# Patient Record
Sex: MALE | Race: WHITE | NOT HISPANIC OR LATINO | Employment: OTHER | URBAN - METROPOLITAN AREA
[De-identification: names, ages, dates, MRNs, and addresses within clinical notes are randomized per-mention and may not be internally consistent; named-entity substitution may affect disease eponyms.]

---

## 2020-12-23 ENCOUNTER — TRANSFERRED RECORDS (OUTPATIENT)
Dept: HEALTH INFORMATION MANAGEMENT | Facility: CLINIC | Age: 60
End: 2020-12-23

## 2021-01-05 PROCEDURE — 88311 DECALCIFY TISSUE: CPT | Mod: 26 | Performed by: PATHOLOGY

## 2021-01-05 PROCEDURE — 88305 TISSUE EXAM BY PATHOLOGIST: CPT | Mod: 26 | Performed by: PATHOLOGY

## 2021-07-12 NOTE — TELEPHONE ENCOUNTER
RECORDS RECEIVED FROM: internal    DATE RECEIVED: 7.29.21    NOTES (FOR ALL VISITS) STATUS DETAILS   OFFICE NOTES from referring provider In process     OFFICE NOTES from other specialist In process     ED NOTES In process     OPERATIVE REPORT  (thyroid, pituitary, adrenal, parathyroid) In process     MEDICATION LIST In process     IMAGING      DEXASCAN In process     MRI (BRAIN) In process     XR (Chest) In process     CT (HEAD/NECK/CHEST/ABDOMEN) In process     NUCLEAR  In process     ULTRASOUND (HEAD/NECK) In process     LABS     DIABETES: HBGA1C, CREATININE, FASTING LIPIDS, MICROALBUMIN URINE, POTASSIUM, TSH, T4    THYROID: TSH, T4, CBC, THYRODLONULIN, TOTAL T3, FREE T4, CALCITONIN, CEA In process          Action 7.12.21 sv   Action Taken Called pt to gether information on getting records, LVM with clinic call back number   7.23.21 sv- called pt and LVM about gathering records   7.26.21 sv- called pt 2x, phone rang once, LVM about records   7.27.21 sv - called pt again with no luck   7.28.21 sv- attempted to call pt again

## 2021-07-29 ENCOUNTER — TELEPHONE (OUTPATIENT)
Dept: ENDOCRINOLOGY | Facility: CLINIC | Age: 61
End: 2021-07-29

## 2021-07-29 ENCOUNTER — LAB (OUTPATIENT)
Dept: LAB | Facility: CLINIC | Age: 61
End: 2021-07-29
Payer: COMMERCIAL

## 2021-07-29 ENCOUNTER — PRE VISIT (OUTPATIENT)
Dept: ENDOCRINOLOGY | Facility: CLINIC | Age: 61
End: 2021-07-29

## 2021-07-29 ENCOUNTER — OFFICE VISIT (OUTPATIENT)
Dept: ENDOCRINOLOGY | Facility: CLINIC | Age: 61
End: 2021-07-29
Payer: COMMERCIAL

## 2021-07-29 VITALS — DIASTOLIC BLOOD PRESSURE: 80 MMHG | HEART RATE: 63 BPM | WEIGHT: 157.1 LBS | SYSTOLIC BLOOD PRESSURE: 131 MMHG

## 2021-07-29 DIAGNOSIS — R79.89 LOW SERUM FOLLICLE STIMULATING HORMONE (FSH): ICD-10-CM

## 2021-07-29 DIAGNOSIS — R79.89 LOW SERUM LUTEINIZING HORMONE (LH): Primary | ICD-10-CM

## 2021-07-29 DIAGNOSIS — R79.89 HIGH SERUM TESTOSTERONE: ICD-10-CM

## 2021-07-29 DIAGNOSIS — R79.89 LOW SERUM LUTEINIZING HORMONE (LH): ICD-10-CM

## 2021-07-29 LAB
FSH SERPL-ACNC: <0.2 IU/L (ref 0.7–10.8)
HCG SERPL QL: NEGATIVE
LH SERPL-ACNC: <0.2 IU/L (ref 1.5–9.3)
PROLACTIN SERPL-MCNC: 6 UG/L (ref 2–18)

## 2021-07-29 PROCEDURE — 84403 ASSAY OF TOTAL TESTOSTERONE: CPT | Mod: 90 | Performed by: PATHOLOGY

## 2021-07-29 PROCEDURE — 83002 ASSAY OF GONADOTROPIN (LH): CPT | Mod: 90 | Performed by: PATHOLOGY

## 2021-07-29 PROCEDURE — 99203 OFFICE O/P NEW LOW 30 MIN: CPT | Mod: GC | Performed by: STUDENT IN AN ORGANIZED HEALTH CARE EDUCATION/TRAINING PROGRAM

## 2021-07-29 PROCEDURE — 84702 CHORIONIC GONADOTROPIN TEST: CPT | Mod: 90 | Performed by: PATHOLOGY

## 2021-07-29 PROCEDURE — 84146 ASSAY OF PROLACTIN: CPT | Mod: 90 | Performed by: PATHOLOGY

## 2021-07-29 PROCEDURE — 36415 COLL VENOUS BLD VENIPUNCTURE: CPT | Performed by: PATHOLOGY

## 2021-07-29 PROCEDURE — 83001 ASSAY OF GONADOTROPIN (FSH): CPT | Mod: 90 | Performed by: PATHOLOGY

## 2021-07-29 RX ORDER — SILODOSIN 8 MG/1
8 CAPSULE ORAL DAILY
COMMUNITY

## 2021-07-29 RX ORDER — MINOCYCLINE HYDROCHLORIDE 100 MG/1
100 CAPSULE ORAL 2 TIMES DAILY
COMMUNITY
End: 2024-06-18

## 2021-07-29 RX ORDER — FLUTICASONE PROPIONATE 44 UG/1
1 AEROSOL, METERED RESPIRATORY (INHALATION) 2 TIMES DAILY
COMMUNITY
End: 2024-06-18

## 2021-07-29 RX ORDER — DULOXETINE 40 MG/1
1 CAPSULE, DELAYED RELEASE ORAL DAILY
COMMUNITY

## 2021-07-29 RX ORDER — FLUTICASONE PROPIONATE 50 MCG
1 SPRAY, SUSPENSION (ML) NASAL DAILY
COMMUNITY

## 2021-07-29 ASSESSMENT — PAIN SCALES - GENERAL: PAINLEVEL: NO PAIN (0)

## 2021-07-29 NOTE — PROGRESS NOTES
Endocrinology Clinic New Consult      Malachi Domínguez MRN:[id number] YOB: 1960  Primary care provider: Trevin Ash A     Reason for Endocrine consult: Abnormal labs - high testosterone and low FSH and LH    HPI:  Malachi Domínguez is a 60 year old male with recent diagnosis of Hepatitis C s/p treatment with MAVYRET for 8 weeks , is  referred to us for low testosterone levels and low FSH and LH levels. He mentions he took an online questionnaire from a [facility] clinic called [facility] and had blood work done which showed his testosterone levels to be in 200-300 range and low LH and FSH. He doesn't have the lab work done prior to starting his medication with him.He is taking  testosterone injections (concentration 100mg/ml) twice weekly used to take 0.3 ml and was reduced to 0.25 ml since 6/2021 and BHCG injections 0.25 ml twice weekly( concentration 50847/6ml) since January 2021. He gets these from a pharmacy based in [place] for 150-200$/ month. He reports he had no sex drive prior to the injections and now feels like that has improved slightly. Denies any erectile dysfunction. He noticed that his testes and penis have reduced in size since starting the medications. He also mentions that he has been having anxiety and jitteriness for past 3 months, attributes it to his friend's suicide. He reports having headaches occasionally, no change in vision. He last took his injections yesterday.    He mentions he has been having fatigue and cramps in his lower extremities for a few years.  For last couple of years, has difficulty sleeping at nights , sleeps usually 2-3 hours at a time, doesn't reports snoring at night, has never been assessed for obstructive sleep apnea. He also mentions he is very physically active person but he noticed he was not developing muscles as he expected. He does 3 times [employer], works out regularly. He reports having nausea for past few years uncertain  when it all started but has to throw up, is not related to food or any particular timing. He mentions that has improved after getting treated for hepatitis C.         ROS:  All 12 systems were reviewed and negative except as mentioned in HPI    Past Medical/Surgical History:  Past Medical History:   Diagnosis Date     Hepatitis C virus infection, unspecified chronicity     S/p 8 weeks of Mavyret- now viral load undetectable     Peripheral polyneuropathy     S/p cervical and lumbar laminectomy     Past Surgical History:   Procedure Laterality Date     LAMINECTOMY  2000    Multiple involves L3-S1     LAMINECTOMY CERIVCAL POSTERIOR TWO LEVELS      1990     Allergies:  Allergies   Allergen Reactions     Azithromycin Shortness Of Breath     Per Dr Elizalde's notes dated 1-12-17     Sulfa Drugs Rash       PTA Meds:  Prior to Admission medications    Medication Sig Last Dose Taking? Auth Provider   Chorionic Gonadotropin (HCG IJ)  Taking Yes Reported, Patient   DULoxetine HCl 40 MG CPEP  Taking Yes Reported, Patient   fexofenadine (ALLEGRA) 30 MG/5ML suspension Take by mouth daily Taking Yes Reported, Patient   fluticasone (FLONASE) 50 MCG/ACT nasal spray Spray 1 spray into both nostrils daily Taking Yes Reported, Patient   fluticasone (FLOVENT HFA) 110 MCG/ACT inhaler Inhale 1 puff into the lungs 2 times daily Taking Yes Reported, Patient   minocycline (MINOCIN) 100 MG capsule Take 100 mg by mouth 2 times daily Taking Yes Reported, Patient   Silodosin (RAPAFLO) 8 MG CAPS capsule Take 4 mg by mouth daily Taking Yes Reported, Patient   TESTOSTERONE ENANTHATE IJ  Taking Yes Reported, Patient        Current heard:   Current Outpatient Medications   Medication     Chorionic Gonadotropin (HCG IJ)     DULoxetine HCl 40 MG CPEP     fexofenadine (ALLEGRA) 30 MG/5ML suspension     fluticasone (FLONASE) 50 MCG/ACT nasal spray     fluticasone (FLOVENT HFA) 110 MCG/ACT inhaler     minocycline (MINOCIN) 100 MG capsule     Silodosin  "(RAPAFLO) 8 MG CAPS capsule     TESTOSTERONE ENANTHATE IJ     No current facility-administered medications for this visit.       Family History:  Family History   Problem Relation Age of Onset     Prostate Cancer Father      Breast Cancer Mother      Skin Cancer Sister      Social History:  Social History     Socioeconomic History     Marital status: Single     Spouse name: Not on file     Number of children: Not on file     Years of education: Not on file     Highest education level: Not on file   Occupational History     Not on file   Tobacco Use     Smoking status: Former Smoker     Packs/day: 1.00     Years: 10.00     Pack years: 10.00     Types: Cigarettes   Substance and Sexual Activity     Alcohol use: Not Currently     Comment: Quit 20 years ago     Drug use: Not Currently     Types: \"Crack\" cocaine     Comment: Was a IV drug user- used cocaine 30 years ago     Sexual activity: Not on file   Other Topics Concern     Not on file   Social History Narrative     Not on file     Social Determinants of Health     Financial Resource Strain:      Difficulty of Paying Living Expenses:    Food Insecurity:      Worried About Running Out of Food in the Last Year:      Ran Out of Food in the Last Year:    Transportation Needs:      Lack of Transportation (Medical):      Lack of Transportation (Non-Medical):    Physical Activity:      Days of Exercise per Week:      Minutes of Exercise per Session:    Stress:      Feeling of Stress :    Social Connections:      Frequency of Communication with Friends and Family:      Frequency of Social Gatherings with Friends and Family:      Attends [employer] Services:      Active Member of Clubs or Organizations:      Attends Club or Organization Meetings:      Marital Status:    Intimate Partner Violence:      Fear of Current or Ex-Partner:      Emotionally Abused:      Physically Abused:      Sexually Abused:          Physical examination:  General appearance: seated comfortably in " the chair during assessment. Not in any acute distress  HEENT: PEERLA. Oral cavity is moist and clear. No thyroid swelling noted, non tender  Lungs: bilateral air entry equal. Clear to auscultation. No rhonchi or crepitations heard  Heart: S1 S2 normal. Pulse: regular rate and rhythm, good volume  Abdomen: soft, nontender, non distended  Genitilia: Testicular volume 25ml bilaterally, normal phallus  Neurological: conscious and oriented. Speech: normal. Moving all four extremities equally  Extremities: no edema noted. Dorsalis pedis 2+ bilaterally. No ulcers noted. No tremor is noted over her outstretched hands  Psychiatric: normal mood and affect. Normal judgment    Endocrine Labs:  2021  Hb : 16.4 mg/dl , hematocrit:51.5  Creat: 1.25  Total testosterone: 1350 ng/dl, free testosterone : 122 ng/dl  SHB.3 nmol/L  Dihydrotestosterone: 118 ng/dl  FSH: <0.3 and LH< 0.3, IGF-1: 125 ng/ml  TSH: 2.7uIU/ml   , free t4: 0.92ng/dl  , free T3: 2.8 pg/dl, Cortisol: 12.3 mcg/ml  PSA: 0.8 ng/ml, Pregnenolone: 13 ng/dl, DHEAS: 12 mcg/dl, estradiol: 9,5 pg/ml  Vitamin D 25-oH : 30.7, Vitamin B12: 754 pg/ml            Assessment and Plan:   Malachi Domínguez is a 60 year old male with recent diagnosis of Hepatitis C s/p treatment with MAVYRET for 8 weeks , is  referred to us for low testosterone levels and low FSH and LH levels    # High testosterone level on testosterone injections  # Low FSH and LH on testosterone and BHCG injections    - Recommended patient send us his blood work done prior to starting the testosterone injections to ascertain if he had low levels  - Will obtain total testosterone, BHCG, prolactin, LH , FSH and CBC  today  - Discussed with him the risks with having high testosterone levels including heart attack, heart failure, stroke and infertility.  - Will consider tapering the testosterone dose once we obtain the results of his blood work.      The patient was seen, examined and discussed with   MD Vijay Bearden MD.  Endocrinology fellow

## 2021-07-29 NOTE — LETTER
7/29/2021       RE: Malachi Domínguez  [address]  [place] [address]     Dear Colleague,    Thank you for referring your patient, Malachi Domínguez, to the [facility] [place] at [facility]. Please see a copy of my visit note below.    Attending tie-in statement: Patient seen and examined by me, discussed with fellow whose note I have reviewed and with which I agree.  Key elements and diagnostic points include the following:    He is on testosterone and HCG (52715/6 ml) 2 times/week since January 2021 started by online clinic in [place]   Price 150-200/month for the 2 drugs using pharmacy in [place].   currently  T Changed from 0.3  to 0.25 ml (? 50 mg at 200 mg/ml concentration) 2 times/week weeks since last labs, 6/1  HCG dose is also 0.25 ml 2 times/week  Last injections yesterday    We don't have labs from prior to treatment but he has it at home.   5/21/2021 ([facility]) Hgb 16.5, hCt 51.5, creatinine 1.25  Testosterone 1350, SHBG 99.3,  (30-85), PSA 0.8, DHEAS 12 mcg/dl (< 298), pregnenolone 30 ng/dl (< 151), FSH < 0.3,LH < 0.3  IGF1 125 , vitamin D 30.7, estradiol 9.5 (nl), free T4 0.92, cortisol 12.3, TSH 2.7, free T3 2.8, B12 754    ROS:  Testes getting smaller   Penis getting smaller  Libido improved a lilttle  Sleeps < 4 hours/night in short intervals up to 2 hours    GENERAL middle aged man in NAD;   /80   Pulse 63   Wt 71.3 kg (157 lb 1.6 oz)   SKIN: normal color, temperature, texture  purple striae  HEENT: PER, EOMI, no scleral icterus, eyelid retraction, stare, lid lag, proptosis or conjunctival injection.    NECK: supple.  No visible neck masses, cervical adenopathy, or goiter. Thyroid is not palpable  LUNGS: clear to auscultation bilaterally.   CARDIAC: RRR, S1, S2 without murmurs, rubs or gallops.    ABDOMEN: positive bowel sounds, soft nontender without hepatosplenomegaly or masses.  BACK:  normal spinal contour.    NEURO: Alert, responds appropriately to questions, moves all extremities, DTRs 2/4, gait normal, no tremor of the outstretched hand  Genitalia: normal phallus; testicular volume 25 ml bilaterally    Assessment/plan:   High testosterone on  Testosterone treatment.  We do not have pre-treatment data. If he is confirmed to have had low T with low FSH/LH prior to treatment then pituitary differential needs to be considered and worked up.  Prolactin with labs today which we expect will again show high T since he just got the dose yesterday    Low FSH and LH on testosterone- as per #1.  This is expected response to feedback inhibition from the testosterone.      On HCG-we believe he is getting 500 units/dose,  presumably this is being given in an attempt to counteract the negative feedback influence of testosterone, intended to stimulate endogenous T production,  Spermatogenesis and increase testicular volume. The dose is considerably lower than the 2000 units three times/week that might be used to try to treat male infertility. The 500 units/dose every other day has been reported with T coadministration   https://pubmed.ncbi.nlm.nih.gov/36156900/  I am surprised at the cost he is reporting for the therapy, which is less than I would have expected.  Is he really getting HCG?   We will measure HCG     High SHBG- his level is higher than I would expect for the T treatment. Perhaps this is an SHBG effect.  Differential includes the liver disease, other.   https://pubmed.ncbi.nlm.nih.gov/6075667/      Terri Bearden MD  42__ minutes spent on the date of the encounter doing chart review, history and exam, documentation and further activities as noted above.        Endocrinology Clinic New Consult      Malachi Domínguez MRN:[id number] YOB: 1960  Primary care provider: Trevin Ash     Reason for Endocrine consult: Abnormal labs - high testosterone and low FSH and  LH    HPI:  Malachi Domínguez is a 60 year old male with recent diagnosis of Hepatitis C s/p treatment with MAVYRET for 8 weeks , is  referred to us for low testosterone levels and low FSH and LH levels. He mentions he took an online questionnaire from a [facility] clinic called [facility] and had blood work done which showed his testosterone levels to be in 200-300 range and low LH and FSH. He doesn't have the lab work done prior to starting his medication with him.He is taking  testosterone injections (concentration 100mg/ml) twice weekly used to take 0.3 ml and was reduced to 0.25 ml since 6/2021 and BHCG injections 0.25 ml twice weekly( concentration 29360/6ml) since January 2021. He gets these from a pharmacy based in [place] for 150-200$/ month. He reports he had no sex drive prior to the injections and now feels like that has improved slightly. Denies any erectile dysfunction. He noticed that his testes and penis have reduced in size since starting the medications. He also mentions that he has been having anxiety and jitteriness for past 3 months, attributes it to his friend's suicide. He reports having headaches occasionally, no change in vision. He last took his injections yesterday.    He mentions he has been having fatigue and cramps in his lower extremities for a few years.  For last couple of years, has difficulty sleeping at nights , sleeps usually 2-3 hours at a time, doesn't reports snoring at night, has never been assessed for obstructive sleep apnea. He also mentions he is very physically active person but he noticed he was not developing muscles as he expected. He does 3 times [employer], works out regularly. He reports having nausea for past few years uncertain when it all started but has to throw up, is not related to food or any particular timing. He mentions that has improved after getting treated for hepatitis C.         ROS:  All 12 systems were reviewed and negative except  as mentioned in HPI    Past Medical/Surgical History:  Past Medical History:   Diagnosis Date     Hepatitis C virus infection, unspecified chronicity     S/p 8 weeks of Mavyret- now viral load undetectable     Peripheral polyneuropathy     S/p cervical and lumbar laminectomy     Past Surgical History:   Procedure Laterality Date     LAMINECTOMY  2000    Multiple involves L3-S1     LAMINECTOMY CERIVCAL POSTERIOR TWO LEVELS      1990     Allergies:  Allergies   Allergen Reactions     Azithromycin Shortness Of Breath     Per Dr Elizalde's notes dated 1-12-17     Sulfa Drugs Rash       PTA Meds:  Prior to Admission medications    Medication Sig Last Dose Taking? Auth Provider   Chorionic Gonadotropin (HCG IJ)  Taking Yes Reported, Patient   DULoxetine HCl 40 MG CPEP  Taking Yes Reported, Patient   fexofenadine (ALLEGRA) 30 MG/5ML suspension Take by mouth daily Taking Yes Reported, Patient   fluticasone (FLONASE) 50 MCG/ACT nasal spray Spray 1 spray into both nostrils daily Taking Yes Reported, Patient   fluticasone (FLOVENT HFA) 110 MCG/ACT inhaler Inhale 1 puff into the lungs 2 times daily Taking Yes Reported, Patient   minocycline (MINOCIN) 100 MG capsule Take 100 mg by mouth 2 times daily Taking Yes Reported, Patient   Silodosin (RAPAFLO) 8 MG CAPS capsule Take 4 mg by mouth daily Taking Yes Reported, Patient   TESTOSTERONE ENANTHATE IJ  Taking Yes Reported, Patient        Current heard:   Current Outpatient Medications   Medication     Chorionic Gonadotropin (HCG IJ)     DULoxetine HCl 40 MG CPEP     fexofenadine (ALLEGRA) 30 MG/5ML suspension     fluticasone (FLONASE) 50 MCG/ACT nasal spray     fluticasone (FLOVENT HFA) 110 MCG/ACT inhaler     minocycline (MINOCIN) 100 MG capsule     Silodosin (RAPAFLO) 8 MG CAPS capsule     TESTOSTERONE ENANTHATE IJ     No current facility-administered medications for this visit.       Family History:  Family History   Problem Relation Age of Onset     Prostate Cancer Father       "Breast Cancer Mother      Skin Cancer Sister      Social History:  Social History     Socioeconomic History     Marital status: Single     Spouse name: Not on file     Number of children: Not on file     Years of education: Not on file     Highest education level: Not on file   Occupational History     Not on file   Tobacco Use     Smoking status: Former Smoker     Packs/day: 1.00     Years: 10.00     Pack years: 10.00     Types: Cigarettes   Substance and Sexual Activity     Alcohol use: Not Currently     Comment: Quit 20 years ago     Drug use: Not Currently     Types: \"Crack\" cocaine     Comment: Was a IV drug user- used cocaine 30 years ago     Sexual activity: Not on file   Other Topics Concern     Not on file   Social History Narrative     Not on file     Social Determinants of Health     Financial Resource Strain:      Difficulty of Paying Living Expenses:    Food Insecurity:      Worried About Running Out of Food in the Last Year:      Ran Out of Food in the Last Year:    Transportation Needs:      Lack of Transportation (Medical):      Lack of Transportation (Non-Medical):    Physical Activity:      Days of Exercise per Week:      Minutes of Exercise per Session:    Stress:      Feeling of Stress :    Social Connections:      Frequency of Communication with Friends and Family:      Frequency of Social Gatherings with Friends and Family:      Attends [employer] Services:      Active Member of Clubs or Organizations:      Attends Club or Organization Meetings:      Marital Status:    Intimate Partner Violence:      Fear of Current or Ex-Partner:      Emotionally Abused:      Physically Abused:      Sexually Abused:          Physical examination:  General appearance: seated comfortably in the chair during assessment. Not in any acute distress  HEENT: PEERLA. Oral cavity is moist and clear. No thyroid swelling noted, non tender  Lungs: bilateral air entry equal. Clear to auscultation. No rhonchi or crepitations " heard  Heart: S1 S2 normal. Pulse: regular rate and rhythm, good volume  Abdomen: soft, nontender, non distended  Genitilia: Testicular volume 25ml bilaterally, normal phallus  Neurological: conscious and oriented. Speech: normal. Moving all four extremities equally  Extremities: no edema noted. Dorsalis pedis 2+ bilaterally. No ulcers noted. No tremor is noted over her outstretched hands  Psychiatric: normal mood and affect. Normal judgment    Endocrine Labs:  2021  Hb : 16.4 mg/dl , hematocrit:51.5  Creat: 1.25  Total testosterone: 1350 ng/dl, free testosterone : 122 ng/dl  SHB.3 nmol/L  Dihydrotestosterone: 118 ng/dl  FSH: <0.3 and LH< 0.3, IGF-1: 125 ng/ml  TSH: 2.7uIU/ml   , free t4: 0.92ng/dl  , free T3: 2.8 pg/dl, Cortisol: 12.3 mcg/ml  PSA: 0.8 ng/ml, Pregnenolone: 13 ng/dl, DHEAS: 12 mcg/dl, estradiol: 9,5 pg/ml  Vitamin D 25-oH : 30.7, Vitamin B12: 754 pg/ml            Assessment and Plan:   Malachi Domínguez is a 60 year old male with recent diagnosis of Hepatitis C s/p treatment with MAVYRET for 8 weeks , is  referred to us for low testosterone levels and low FSH and LH levels    # High testosterone level on testosterone injections  # Low FSH and LH on testosterone and BHCG injections    - Recommended patient send us his blood work done prior to starting the testosterone injections to ascertain if he had low levels  - Will obtain total testosterone, BHCG, prolactin, LH , FSH and CBC  today  - Discussed with him the risks with having high testosterone levels including heart attack, heart failure, stroke and infertility.  - Will consider tapering the testosterone dose once we obtain the results of his blood work.      The patient was seen, examined and discussed with MD Vijay Shelley MD.  Endocrinology fellow

## 2021-07-29 NOTE — TELEPHONE ENCOUNTER
Duncan  calls for clarification on HCG order. HCG as ordered goes to 20iu per Liter, Tumor marker goes to 5iu per liter.   Providers notified in person.   Gricelda Wallace RN on 7/29/2021 at 10:19 AM

## 2021-07-29 NOTE — PROGRESS NOTES
Attending tie-in statement: Patient seen and examined by me, discussed with fellow whose note I have reviewed and with which I agree.  Key elements and diagnostic points include the following:    He is on testosterone and HCG (25182/6 ml) 2 times/week since January 2021 started by online clinic in [place]   Price 150-200/month for the 2 drugs using pharmacy in [place].   currently  T Changed from 0.3  to 0.25 ml (? 50 mg at 200 mg/ml concentration) 2 times/week weeks since last labs, 6/1  HCG dose is also 0.25 ml 2 times/week  Last injections yesterday    We don't have labs from prior to treatment but he has it at home.   5/21/2021 ([facility]) Hgb 16.5, hCt 51.5, creatinine 1.25  Testosterone 1350, SHBG 99.3,  (30-85), PSA 0.8, DHEAS 12 mcg/dl (< 298), pregnenolone 30 ng/dl (< 151), FSH < 0.3,LH < 0.3  IGF1 125 , vitamin D 30.7, estradiol 9.5 (nl), free T4 0.92, cortisol 12.3, TSH 2.7, free T3 2.8, B12 754    ROS:  Testes getting smaller   Penis getting smaller  Libido improved a lilttle  Sleeps < 4 hours/night in short intervals up to 2 hours    GENERAL middle aged man in NAD;   /80   Pulse 63   Wt 71.3 kg (157 lb 1.6 oz)   SKIN: normal color, temperature, texture  purple striae  HEENT: PER, EOMI, no scleral icterus, eyelid retraction, stare, lid lag, proptosis or conjunctival injection.    NECK: supple.  No visible neck masses, cervical adenopathy, or goiter. Thyroid is not palpable  LUNGS: clear to auscultation bilaterally.   CARDIAC: RRR, S1, S2 without murmurs, rubs or gallops.    ABDOMEN: positive bowel sounds, soft nontender without hepatosplenomegaly or masses.  BACK: normal spinal contour.    NEURO: Alert, responds appropriately to questions, moves all extremities, DTRs 2/4, gait normal, no tremor of the outstretched hand  Genitalia: normal phallus; testicular volume 25 ml bilaterally    Assessment/plan:   High testosterone on  Testosterone treatment.  We do not have pre-treatment data. If he is  confirmed to have had low T with low FSH/LH prior to treatment then pituitary differential needs to be considered and worked up.  Prolactin with labs today which we expect will again show high T since he just got the dose yesterday    Low FSH and LH on testosterone- as per #1.  This is expected response to feedback inhibition from the testosterone.      On HCG-we believe he is getting 500 units/dose,  presumably this is being given in an attempt to counteract the negative feedback influence of testosterone, intended to stimulate endogenous T production,  Spermatogenesis and increase testicular volume. The dose is considerably lower than the 2000 units three times/week that might be used to try to treat male infertility. The 500 units/dose every other day has been reported with T coadministration   https://pubmed.ncbi.nlm.nih.gov/23499879/  I am surprised at the cost he is reporting for the therapy, which is less than I would have expected.  Is he really getting HCG?   We will measure HCG     High SHBG- his level is higher than I would expect for the T treatment. Perhaps this is an SHBG effect.  Differential includes the liver disease, other.   https://pubmed.ncbi.nlm.nih.gov/7016910/      Terri Bearden MD  42__ minutes spent on the date of the encounter doing chart review, history and exam, documentation and further activities as noted above.

## 2021-07-29 NOTE — TELEPHONE ENCOUNTER
Duncan, : He will credit for current order HCG qualitative, Blood ([id number])  Dr Cheung states quantitative HCG requested.   They will run new order when in place with same specimen.   Providers notified.   Gricelda Wallace RN on 7/29/2021 at 11:18 AM

## 2021-07-29 NOTE — TELEPHONE ENCOUNTER
----- Message from Terri Bearden MD sent at 7/29/2021 11:06 AM CDT -----  Regarding: HCG  We got the wrong HCG, not the one we discussed.  Please confirm if they are doing the one we discussed and get the wrong one credited.  Thanks  Terri Bearden

## 2021-07-30 PROBLEM — R79.89 HIGH SERUM TESTOSTERONE: Status: ACTIVE | Noted: 2021-07-30

## 2021-07-30 PROBLEM — R79.89 LOW SERUM LUTEINIZING HORMONE (LH): Status: ACTIVE | Noted: 2021-07-30

## 2021-07-30 PROBLEM — R79.89 LOW SERUM FOLLICLE STIMULATING HORMONE (FSH): Status: ACTIVE | Noted: 2021-07-30

## 2021-07-30 LAB — HCG-TM SERPL-ACNC: 12 IU/L

## 2021-08-02 LAB — TESTOST SERPL-MCNC: 1419 NG/DL (ref 240–950)

## 2021-08-03 ENCOUNTER — TELEPHONE (OUTPATIENT)
Dept: ENDOCRINOLOGY | Facility: CLINIC | Age: 61
End: 2021-08-03

## 2021-08-03 NOTE — TELEPHONE ENCOUNTER
Detailed message left for patient to give us information on where he got labs completed at so we can request.

## 2021-08-03 NOTE — TELEPHONE ENCOUNTER
[facility]    Phone Message    May a detailed message be left on voicemail: yes , Patient is wanting to get a call back in regards to getting the results or sent the results on [employer]. Please advise.    Patient is also wanting to get help with uploading previous results from 6 months ago onto SiS[employer] for Dr. Bearden to review.     Reason for Call: Requesting Results     Name/type of test: Blood work    Date of test: 7/29/2021    Was test done at a location other than [facility] (Please fill in the location if not [facility])?: No      Action Taken: Message routed to:  [facility] ([facility] Endo    Travel Screening: Not Applicable

## 2021-08-06 ENCOUNTER — TELEPHONE (OUTPATIENT)
Dept: ENDOCRINOLOGY | Facility: CLINIC | Age: 61
End: 2021-08-06

## 2021-09-11 ENCOUNTER — HEALTH MAINTENANCE LETTER (OUTPATIENT)
Age: 61
End: 2021-09-11

## 2021-11-06 ENCOUNTER — HEALTH MAINTENANCE LETTER (OUTPATIENT)
Age: 61
End: 2021-11-06

## 2021-12-06 DIAGNOSIS — Z11.59 ENCOUNTER FOR SCREENING FOR OTHER VIRAL DISEASES: ICD-10-CM

## 2021-12-27 ENCOUNTER — LAB (OUTPATIENT)
Dept: URGENT CARE | Facility: URGENT CARE | Age: 61
End: 2021-12-27
Attending: ORTHOPAEDIC SURGERY
Payer: COMMERCIAL

## 2021-12-27 DIAGNOSIS — Z11.59 ENCOUNTER FOR SCREENING FOR OTHER VIRAL DISEASES: ICD-10-CM

## 2021-12-27 PROCEDURE — U0003 INFECTIOUS AGENT DETECTION BY NUCLEIC ACID (DNA OR RNA); SEVERE ACUTE RESPIRATORY SYNDROME CORONAVIRUS 2 (SARS-COV-2) (CORONAVIRUS DISEASE [COVID-19]), AMPLIFIED PROBE TECHNIQUE, MAKING USE OF HIGH THROUGHPUT TECHNOLOGIES AS DESCRIBED BY CMS-2020-01-R: HCPCS

## 2021-12-27 PROCEDURE — U0005 INFEC AGEN DETEC AMPLI PROBE: HCPCS

## 2021-12-28 ENCOUNTER — ANESTHESIA EVENT (OUTPATIENT)
Dept: SURGERY | Facility: CLINIC | Age: 61
End: 2021-12-28
Payer: COMMERCIAL

## 2021-12-28 LAB — SARS-COV-2 RNA RESP QL NAA+PROBE: NEGATIVE

## 2021-12-29 RX ORDER — FEXOFENADINE HCL AND PSEUDOEPHEDRINE HCL 180; 240 MG/1; MG/1
1 TABLET, EXTENDED RELEASE ORAL DAILY
COMMUNITY

## 2021-12-29 RX ORDER — HYDROCODONE BITARTRATE AND ACETAMINOPHEN 5; 325 MG/1; MG/1
1 TABLET ORAL EVERY 6 HOURS PRN
Status: ON HOLD | COMMUNITY
End: 2021-12-31

## 2021-12-29 RX ORDER — ALBUTEROL SULFATE 90 UG/1
2 AEROSOL, METERED RESPIRATORY (INHALATION) EVERY 6 HOURS
COMMUNITY

## 2021-12-29 NOTE — PROGRESS NOTES
PTA medications updated by Medication Scribe prior to surgery via phone call with patient (last doses completed by Nurse)     Medication history sources: Patient, Surescripts, H&P and Patient's home med list  In the past week, patient estimated taking medication this percent of the time: Greater than 90%  Adherence assessment: N/A Not Observed    Significant changes made to the medication list:  None      Additional medication history information:   Patient brought own home meds: Flonase Nasal Spray, Flovent Inhaler    Medication reconciliation completed by provider prior to medication history? No    Time spent in this activity: 25 minutes    The information provided in this note is only as accurate as the sources available at the time of update(s)    Prior to Admission medications    Medication Sig Last Dose Taking? Auth Provider   albuterol (PROAIR HFA/PROVENTIL HFA/VENTOLIN HFA) 108 (90 Base) MCG/ACT inhaler Inhale 2 puffs into the lungs every 6 hours  at PRN Yes Reported, Patient   ASHWAGANDHA PO Take 1 g by mouth daily MORE THAN A WEEK Yes Reported, Patient   Chorionic Gonadotropin (HCG IJ) Inject as directed twice a week  12/26/2021 at AM Yes Reported, Patient   DULoxetine HCl 40 MG CPEP Take 1 capsule by mouth daily  12/29/2021 at AM Yes Reported, Patient   fexofenadine-pseudoePHEDrine (ALLEGRA-D 24) 180-240 MG 24 hr tablet Take 1 tablet by mouth daily 12/29/2021 at AM Yes Reported, Patient   fluticasone (FLONASE) 50 MCG/ACT nasal spray Spray 1 spray into both nostrils daily 12/29/2021 at AM Yes Reported, Patient   fluticasone (FLOVENT HFA) 110 MCG/ACT inhaler Inhale 1 puff into the lungs 2 times daily 12/29/2021 at PM Yes Reported, Patient   HYDROcodone-acetaminophen (NORCO) 5-325 MG tablet Take 1 tablet by mouth every 6 hours as needed for severe pain 12/29/2021 at PM Yes Reported, Patient   minocycline (MINOCIN) 100 MG capsule Take 100 mg by mouth 2 times daily 12/29/2021 at PM Yes Reported, Patient    Silodosin (RAPAFLO) 8 MG CAPS capsule Take 8 mg by mouth daily  12/29/2021 at AM Yes Reported, Patient   TESTOSTERONE ENANTHATE IJ Inject as directed twice a week  12/26/2021 at AM Yes Reported, Patient     Medication history completed by:    Mckinley Naqvi CPhT  Medication [facility]

## 2021-12-30 ENCOUNTER — ANESTHESIA (OUTPATIENT)
Dept: SURGERY | Facility: CLINIC | Age: 61
End: 2021-12-30
Payer: COMMERCIAL

## 2021-12-30 ENCOUNTER — APPOINTMENT (OUTPATIENT)
Dept: GENERAL RADIOLOGY | Facility: CLINIC | Age: 61
End: 2021-12-30
Attending: STUDENT IN AN ORGANIZED HEALTH CARE EDUCATION/TRAINING PROGRAM
Payer: COMMERCIAL

## 2021-12-30 ENCOUNTER — HOSPITAL ENCOUNTER (OUTPATIENT)
Facility: CLINIC | Age: 61
Discharge: HOME | End: 2021-12-31
Attending: ORTHOPAEDIC SURGERY | Admitting: ORTHOPAEDIC SURGERY
Payer: COMMERCIAL

## 2021-12-30 DIAGNOSIS — G89.18 POSTOPERATIVE PAIN: Primary | ICD-10-CM

## 2021-12-30 PROBLEM — Z98.890 POSTOPERATIVE STATE: Status: ACTIVE | Noted: 2021-12-30

## 2021-12-30 LAB — HGB BLD-MCNC: 15.3 G/DL (ref 13.3–17.7)

## 2021-12-30 PROCEDURE — 88305 TISSUE EXAM BY PATHOLOGIST: CPT | Mod: TC | Performed by: ORTHOPAEDIC SURGERY

## 2021-12-30 PROCEDURE — 999N000141 HC STATISTIC PRE-PROCEDURE NURSING ASSESSMENT: Performed by: ORTHOPAEDIC SURGERY

## 2021-12-30 PROCEDURE — 999N000065 XR SHOULDER RIGHT PORT G/E 2 VIEWS: Mod: RT

## 2021-12-30 PROCEDURE — 88311 DECALCIFY TISSUE: CPT | Mod: TC | Performed by: ORTHOPAEDIC SURGERY

## 2021-12-30 PROCEDURE — 250N000025 HC SEVOFLURANE, PER MIN: Performed by: ORTHOPAEDIC SURGERY

## 2021-12-30 PROCEDURE — 250N000011 HC RX IP 250 OP 636: Performed by: SURGERY

## 2021-12-30 PROCEDURE — 370N000017 HC ANESTHESIA TECHNICAL FEE, PER MIN: Performed by: ORTHOPAEDIC SURGERY

## 2021-12-30 PROCEDURE — 278N000051 HC OR IMPLANT GENERAL: Performed by: ORTHOPAEDIC SURGERY

## 2021-12-30 PROCEDURE — 250N000009 HC RX 250: Performed by: NURSE ANESTHETIST, CERTIFIED REGISTERED

## 2021-12-30 PROCEDURE — 258N000003 HC RX IP 258 OP 636: Performed by: ANESTHESIOLOGY

## 2021-12-30 PROCEDURE — 250N000011 HC RX IP 250 OP 636: Performed by: STUDENT IN AN ORGANIZED HEALTH CARE EDUCATION/TRAINING PROGRAM

## 2021-12-30 PROCEDURE — 99207 PR NO CHARGE LOS: CPT | Performed by: PHYSICIAN ASSISTANT

## 2021-12-30 PROCEDURE — 250N000013 HC RX MED GY IP 250 OP 250 PS 637: Performed by: STUDENT IN AN ORGANIZED HEALTH CARE EDUCATION/TRAINING PROGRAM

## 2021-12-30 PROCEDURE — 360N000077 HC SURGERY LEVEL 4, PER MIN: Performed by: ORTHOPAEDIC SURGERY

## 2021-12-30 PROCEDURE — 85018 HEMOGLOBIN: CPT | Performed by: ANESTHESIOLOGY

## 2021-12-30 PROCEDURE — 250N000013 HC RX MED GY IP 250 OP 250 PS 637: Performed by: PHYSICIAN ASSISTANT

## 2021-12-30 PROCEDURE — 36415 COLL VENOUS BLD VENIPUNCTURE: CPT | Performed by: ANESTHESIOLOGY

## 2021-12-30 PROCEDURE — 258N000003 HC RX IP 258 OP 636: Performed by: NURSE ANESTHETIST, CERTIFIED REGISTERED

## 2021-12-30 PROCEDURE — 272N000001 HC OR GENERAL SUPPLY STERILE: Performed by: ORTHOPAEDIC SURGERY

## 2021-12-30 PROCEDURE — 250N000011 HC RX IP 250 OP 636: Performed by: ORTHOPAEDIC SURGERY

## 2021-12-30 PROCEDURE — 710N000009 HC RECOVERY PHASE 1, LEVEL 1, PER MIN: Performed by: ORTHOPAEDIC SURGERY

## 2021-12-30 PROCEDURE — 250N000011 HC RX IP 250 OP 636: Performed by: NURSE ANESTHETIST, CERTIFIED REGISTERED

## 2021-12-30 PROCEDURE — P9041 ALBUMIN (HUMAN),5%, 50ML: HCPCS | Performed by: NURSE ANESTHETIST, CERTIFIED REGISTERED

## 2021-12-30 PROCEDURE — 258N000003 HC RX IP 258 OP 636: Performed by: STUDENT IN AN ORGANIZED HEALTH CARE EDUCATION/TRAINING PROGRAM

## 2021-12-30 PROCEDURE — 250N000009 HC RX 250: Performed by: SURGERY

## 2021-12-30 PROCEDURE — C1776 JOINT DEVICE (IMPLANTABLE): HCPCS | Performed by: ORTHOPAEDIC SURGERY

## 2021-12-30 DEVICE — IMPLANTABLE DEVICE: Type: IMPLANTABLE DEVICE | Site: SHOULDER | Status: FUNCTIONAL

## 2021-12-30 DEVICE — HALF DOSE BONE CEMENT, 10 PACK CATALOG NUMBER IS 6188-1-010
Type: IMPLANTABLE DEVICE | Site: SHOULDER | Status: FUNCTIONAL
Brand: SIMPLEX

## 2021-12-30 DEVICE — IMPLANTABLE DEVICE
Type: IMPLANTABLE DEVICE | Site: SHOULDER | Status: FUNCTIONAL
Brand: AEQUALIS™ ASCEND™ FLEX

## 2021-12-30 DEVICE — IMPLANTABLE DEVICE
Type: IMPLANTABLE DEVICE | Site: SHOULDER | Status: FUNCTIONAL
Brand: AEQUALIS™ PERFORM

## 2021-12-30 RX ORDER — ACETAMINOPHEN 325 MG/1
650 TABLET ORAL EVERY 4 HOURS PRN
Status: DISCONTINUED | OUTPATIENT
Start: 2022-01-02 | End: 2021-12-31 | Stop reason: HOSPADM

## 2021-12-30 RX ORDER — ONDANSETRON 2 MG/ML
INJECTION INTRAMUSCULAR; INTRAVENOUS PRN
Status: DISCONTINUED | OUTPATIENT
Start: 2021-12-30 | End: 2021-12-30

## 2021-12-30 RX ORDER — HYDROXYZINE HYDROCHLORIDE 25 MG/1
25 TABLET, FILM COATED ORAL EVERY 6 HOURS PRN
Status: DISCONTINUED | OUTPATIENT
Start: 2021-12-30 | End: 2021-12-31 | Stop reason: HOSPADM

## 2021-12-30 RX ORDER — ASPIRIN 81 MG/1
81 TABLET ORAL 2 TIMES DAILY
Status: DISCONTINUED | OUTPATIENT
Start: 2021-12-30 | End: 2021-12-31 | Stop reason: HOSPADM

## 2021-12-30 RX ORDER — NALOXONE HYDROCHLORIDE 0.4 MG/ML
0.4 INJECTION, SOLUTION INTRAMUSCULAR; INTRAVENOUS; SUBCUTANEOUS
Status: DISCONTINUED | OUTPATIENT
Start: 2021-12-30 | End: 2021-12-31 | Stop reason: HOSPADM

## 2021-12-30 RX ORDER — PROPOFOL 10 MG/ML
INJECTION, EMULSION INTRAVENOUS CONTINUOUS PRN
Status: DISCONTINUED | OUTPATIENT
Start: 2021-12-30 | End: 2021-12-30

## 2021-12-30 RX ORDER — ALBUMIN, HUMAN INJ 5% 5 %
SOLUTION INTRAVENOUS CONTINUOUS PRN
Status: DISCONTINUED | OUTPATIENT
Start: 2021-12-30 | End: 2021-12-30

## 2021-12-30 RX ORDER — LIDOCAINE 40 MG/G
CREAM TOPICAL
Status: DISCONTINUED | OUTPATIENT
Start: 2021-12-30 | End: 2021-12-31 | Stop reason: HOSPADM

## 2021-12-30 RX ORDER — HYDROMORPHONE HCL IN WATER/PF 6 MG/30 ML
0.4 PATIENT CONTROLLED ANALGESIA SYRINGE INTRAVENOUS
Status: DISCONTINUED | OUTPATIENT
Start: 2021-12-30 | End: 2021-12-31 | Stop reason: HOSPADM

## 2021-12-30 RX ORDER — NALOXONE HYDROCHLORIDE 0.4 MG/ML
0.2 INJECTION, SOLUTION INTRAMUSCULAR; INTRAVENOUS; SUBCUTANEOUS
Status: DISCONTINUED | OUTPATIENT
Start: 2021-12-30 | End: 2021-12-31 | Stop reason: HOSPADM

## 2021-12-30 RX ORDER — FEXOFENADINE HCL AND PSEUDOEPHEDRINE HCL 180; 240 MG/1; MG/1
1 TABLET, EXTENDED RELEASE ORAL DAILY
Status: DISCONTINUED | OUTPATIENT
Start: 2021-12-30 | End: 2021-12-31 | Stop reason: HOSPADM

## 2021-12-30 RX ORDER — OXYCODONE HYDROCHLORIDE 5 MG/1
10 TABLET ORAL EVERY 4 HOURS PRN
Status: DISCONTINUED | OUTPATIENT
Start: 2021-12-30 | End: 2021-12-31

## 2021-12-30 RX ORDER — SODIUM CHLORIDE, SODIUM LACTATE, POTASSIUM CHLORIDE, CALCIUM CHLORIDE 600; 310; 30; 20 MG/100ML; MG/100ML; MG/100ML; MG/100ML
INJECTION, SOLUTION INTRAVENOUS CONTINUOUS
Status: DISCONTINUED | OUTPATIENT
Start: 2021-12-30 | End: 2021-12-30 | Stop reason: HOSPADM

## 2021-12-30 RX ORDER — GLYCOPYRROLATE 0.2 MG/ML
INJECTION, SOLUTION INTRAMUSCULAR; INTRAVENOUS PRN
Status: DISCONTINUED | OUTPATIENT
Start: 2021-12-30 | End: 2021-12-30

## 2021-12-30 RX ORDER — FENTANYL CITRATE 50 UG/ML
INJECTION, SOLUTION INTRAMUSCULAR; INTRAVENOUS PRN
Status: DISCONTINUED | OUTPATIENT
Start: 2021-12-30 | End: 2021-12-30

## 2021-12-30 RX ORDER — NEOSTIGMINE METHYLSULFATE 1 MG/ML
VIAL (ML) INJECTION PRN
Status: DISCONTINUED | OUTPATIENT
Start: 2021-12-30 | End: 2021-12-30

## 2021-12-30 RX ORDER — PROCHLORPERAZINE MALEATE 5 MG
10 TABLET ORAL EVERY 6 HOURS PRN
Status: DISCONTINUED | OUTPATIENT
Start: 2021-12-30 | End: 2021-12-31 | Stop reason: HOSPADM

## 2021-12-30 RX ORDER — ONDANSETRON 4 MG/1
4 TABLET, ORALLY DISINTEGRATING ORAL EVERY 30 MIN PRN
Status: DISCONTINUED | OUTPATIENT
Start: 2021-12-30 | End: 2021-12-30 | Stop reason: HOSPADM

## 2021-12-30 RX ORDER — CALCIUM CARBONATE 300MG(750)
TABLET,CHEWABLE ORAL
COMMUNITY

## 2021-12-30 RX ORDER — PROPOFOL 10 MG/ML
INJECTION, EMULSION INTRAVENOUS PRN
Status: DISCONTINUED | OUTPATIENT
Start: 2021-12-30 | End: 2021-12-30

## 2021-12-30 RX ORDER — ONDANSETRON 2 MG/ML
4 INJECTION INTRAMUSCULAR; INTRAVENOUS EVERY 6 HOURS PRN
Status: DISCONTINUED | OUTPATIENT
Start: 2021-12-30 | End: 2021-12-31 | Stop reason: HOSPADM

## 2021-12-30 RX ORDER — FLUTICASONE PROPIONATE 50 MCG
1 SPRAY, SUSPENSION (ML) NASAL DAILY
Status: DISCONTINUED | OUTPATIENT
Start: 2021-12-31 | End: 2021-12-31 | Stop reason: HOSPADM

## 2021-12-30 RX ORDER — AMOXICILLIN 250 MG
1 CAPSULE ORAL 2 TIMES DAILY
Status: DISCONTINUED | OUTPATIENT
Start: 2021-12-30 | End: 2021-12-31 | Stop reason: HOSPADM

## 2021-12-30 RX ORDER — DULOXETIN HYDROCHLORIDE 20 MG/1
40 CAPSULE, DELAYED RELEASE ORAL DAILY
Status: DISCONTINUED | OUTPATIENT
Start: 2021-12-30 | End: 2021-12-31 | Stop reason: HOSPADM

## 2021-12-30 RX ORDER — ONDANSETRON 4 MG/1
4 TABLET, ORALLY DISINTEGRATING ORAL EVERY 6 HOURS PRN
Status: DISCONTINUED | OUTPATIENT
Start: 2021-12-30 | End: 2021-12-31 | Stop reason: HOSPADM

## 2021-12-30 RX ORDER — VANCOMYCIN HYDROCHLORIDE 1 G/20ML
INJECTION, POWDER, LYOPHILIZED, FOR SOLUTION INTRAVENOUS PRN
Status: DISCONTINUED | OUTPATIENT
Start: 2021-12-30 | End: 2021-12-30 | Stop reason: HOSPADM

## 2021-12-30 RX ORDER — HYDROMORPHONE HCL IN WATER/PF 6 MG/30 ML
0.4 PATIENT CONTROLLED ANALGESIA SYRINGE INTRAVENOUS EVERY 5 MIN PRN
Status: DISCONTINUED | OUTPATIENT
Start: 2021-12-30 | End: 2021-12-30 | Stop reason: HOSPADM

## 2021-12-30 RX ORDER — EPHEDRINE SULFATE 50 MG/ML
INJECTION, SOLUTION INTRAMUSCULAR; INTRAVENOUS; SUBCUTANEOUS PRN
Status: DISCONTINUED | OUTPATIENT
Start: 2021-12-30 | End: 2021-12-30

## 2021-12-30 RX ORDER — CEFAZOLIN SODIUM 2 G/100ML
2 INJECTION, SOLUTION INTRAVENOUS SEE ADMIN INSTRUCTIONS
Status: DISCONTINUED | OUTPATIENT
Start: 2021-12-30 | End: 2021-12-30 | Stop reason: HOSPADM

## 2021-12-30 RX ORDER — OXYCODONE HYDROCHLORIDE 5 MG/1
5 TABLET ORAL EVERY 4 HOURS PRN
Status: DISCONTINUED | OUTPATIENT
Start: 2021-12-30 | End: 2021-12-31

## 2021-12-30 RX ORDER — ONDANSETRON 2 MG/ML
4 INJECTION INTRAMUSCULAR; INTRAVENOUS EVERY 30 MIN PRN
Status: DISCONTINUED | OUTPATIENT
Start: 2021-12-30 | End: 2021-12-30 | Stop reason: HOSPADM

## 2021-12-30 RX ORDER — OXYCODONE HYDROCHLORIDE 5 MG/1
5 TABLET ORAL EVERY 4 HOURS PRN
Status: CANCELLED | OUTPATIENT
Start: 2021-12-30

## 2021-12-30 RX ORDER — DEXAMETHASONE SODIUM PHOSPHATE 4 MG/ML
INJECTION, SOLUTION INTRA-ARTICULAR; INTRALESIONAL; INTRAMUSCULAR; INTRAVENOUS; SOFT TISSUE PRN
Status: DISCONTINUED | OUTPATIENT
Start: 2021-12-30 | End: 2021-12-30

## 2021-12-30 RX ORDER — FENTANYL CITRATE 0.05 MG/ML
25 INJECTION, SOLUTION INTRAMUSCULAR; INTRAVENOUS EVERY 5 MIN PRN
Status: DISCONTINUED | OUTPATIENT
Start: 2021-12-30 | End: 2021-12-30 | Stop reason: HOSPADM

## 2021-12-30 RX ORDER — TAMSULOSIN HYDROCHLORIDE 0.4 MG/1
0.4 CAPSULE ORAL DAILY
Status: DISCONTINUED | OUTPATIENT
Start: 2021-12-30 | End: 2021-12-31 | Stop reason: HOSPADM

## 2021-12-30 RX ORDER — HYDROMORPHONE HCL IN WATER/PF 6 MG/30 ML
0.2 PATIENT CONTROLLED ANALGESIA SYRINGE INTRAVENOUS
Status: DISCONTINUED | OUTPATIENT
Start: 2021-12-30 | End: 2021-12-31 | Stop reason: HOSPADM

## 2021-12-30 RX ORDER — FLUTICASONE PROPIONATE 44 UG/1
1 AEROSOL, METERED RESPIRATORY (INHALATION) 2 TIMES DAILY
Status: DISCONTINUED | OUTPATIENT
Start: 2021-12-30 | End: 2021-12-31 | Stop reason: HOSPADM

## 2021-12-30 RX ORDER — CEFAZOLIN SODIUM 2 G/100ML
2 INJECTION, SOLUTION INTRAVENOUS
Status: COMPLETED | OUTPATIENT
Start: 2021-12-30 | End: 2021-12-30

## 2021-12-30 RX ORDER — FLUTICASONE PROPIONATE 50 MCG
1 SPRAY, SUSPENSION (ML) NASAL DAILY
Status: DISCONTINUED | OUTPATIENT
Start: 2021-12-30 | End: 2021-12-30

## 2021-12-30 RX ORDER — TRANEXAMIC ACID 650 MG/1
1950 TABLET ORAL ONCE
Status: COMPLETED | OUTPATIENT
Start: 2021-12-30 | End: 2021-12-30

## 2021-12-30 RX ORDER — ACETAMINOPHEN 325 MG/1
975 TABLET ORAL ONCE
Status: COMPLETED | OUTPATIENT
Start: 2021-12-30 | End: 2021-12-30

## 2021-12-30 RX ORDER — CEFAZOLIN SODIUM 1 G/3ML
1 INJECTION, POWDER, FOR SOLUTION INTRAMUSCULAR; INTRAVENOUS EVERY 8 HOURS
Status: COMPLETED | OUTPATIENT
Start: 2021-12-30 | End: 2021-12-31

## 2021-12-30 RX ORDER — SODIUM CHLORIDE, SODIUM LACTATE, POTASSIUM CHLORIDE, CALCIUM CHLORIDE 600; 310; 30; 20 MG/100ML; MG/100ML; MG/100ML; MG/100ML
INJECTION, SOLUTION INTRAVENOUS CONTINUOUS
Status: DISCONTINUED | OUTPATIENT
Start: 2021-12-30 | End: 2021-12-31 | Stop reason: HOSPADM

## 2021-12-30 RX ORDER — LIDOCAINE HYDROCHLORIDE 20 MG/ML
INJECTION, SOLUTION INFILTRATION; PERINEURAL PRN
Status: DISCONTINUED | OUTPATIENT
Start: 2021-12-30 | End: 2021-12-30

## 2021-12-30 RX ORDER — ACETAMINOPHEN 325 MG/1
975 TABLET ORAL EVERY 8 HOURS
Status: DISCONTINUED | OUTPATIENT
Start: 2021-12-30 | End: 2021-12-31 | Stop reason: HOSPADM

## 2021-12-30 RX ADMIN — DEXAMETHASONE SODIUM PHOSPHATE 4 MG: 4 INJECTION, SOLUTION INTRA-ARTICULAR; INTRALESIONAL; INTRAMUSCULAR; INTRAVENOUS; SOFT TISSUE at 10:14

## 2021-12-30 RX ADMIN — FENTANYL CITRATE 50 MCG: 50 INJECTION, SOLUTION INTRAMUSCULAR; INTRAVENOUS at 10:04

## 2021-12-30 RX ADMIN — CEFAZOLIN SODIUM 2 G: 2 INJECTION, SOLUTION INTRAVENOUS at 09:49

## 2021-12-30 RX ADMIN — Medication 12 MCG: at 11:32

## 2021-12-30 RX ADMIN — Medication 8 MCG: at 12:16

## 2021-12-30 RX ADMIN — SODIUM CHLORIDE, POTASSIUM CHLORIDE, SODIUM LACTATE AND CALCIUM CHLORIDE: 600; 310; 30; 20 INJECTION, SOLUTION INTRAVENOUS at 12:13

## 2021-12-30 RX ADMIN — ACETAMINOPHEN 975 MG: 325 TABLET, FILM COATED ORAL at 20:31

## 2021-12-30 RX ADMIN — PROPOFOL 30 MCG/KG/MIN: 10 INJECTION, EMULSION INTRAVENOUS at 10:10

## 2021-12-30 RX ADMIN — PROPOFOL 40 MG: 10 INJECTION, EMULSION INTRAVENOUS at 12:40

## 2021-12-30 RX ADMIN — HYDROXYZINE HYDROCHLORIDE 25 MG: 25 TABLET ORAL at 22:56

## 2021-12-30 RX ADMIN — LIDOCAINE HYDROCHLORIDE 80 MG: 20 INJECTION, SOLUTION INFILTRATION; PERINEURAL at 09:59

## 2021-12-30 RX ADMIN — PHENYLEPHRINE HYDROCHLORIDE 50 MCG: 10 INJECTION INTRAVENOUS at 10:37

## 2021-12-30 RX ADMIN — PHENYLEPHRINE HYDROCHLORIDE 0.5 MCG/KG/MIN: 10 INJECTION INTRAVENOUS at 10:41

## 2021-12-30 RX ADMIN — HYDROMORPHONE HYDROCHLORIDE 0.4 MG: 0.2 INJECTION, SOLUTION INTRAMUSCULAR; INTRAVENOUS; SUBCUTANEOUS at 23:50

## 2021-12-30 RX ADMIN — FLUTICASONE PROPIONATE 1 PUFF: 44 AEROSOL, METERED RESPIRATORY (INHALATION) at 20:45

## 2021-12-30 RX ADMIN — TRANEXAMIC ACID 1950 MG: 650 TABLET ORAL at 08:23

## 2021-12-30 RX ADMIN — HYDROMORPHONE HYDROCHLORIDE 0.4 MG: 0.2 INJECTION, SOLUTION INTRAMUSCULAR; INTRAVENOUS; SUBCUTANEOUS at 19:43

## 2021-12-30 RX ADMIN — GLYCOPYRROLATE 0.6 MG: 0.2 INJECTION, SOLUTION INTRAMUSCULAR; INTRAVENOUS at 12:38

## 2021-12-30 RX ADMIN — SENNOSIDES AND DOCUSATE SODIUM 1 TABLET: 50; 8.6 TABLET ORAL at 20:31

## 2021-12-30 RX ADMIN — PROPOFOL 200 MG: 10 INJECTION, EMULSION INTRAVENOUS at 09:59

## 2021-12-30 RX ADMIN — ACETAMINOPHEN 650 MG: 325 TABLET, FILM COATED ORAL at 14:58

## 2021-12-30 RX ADMIN — ACETAMINOPHEN 975 MG: 325 TABLET, FILM COATED ORAL at 08:22

## 2021-12-30 RX ADMIN — ONDANSETRON 4 MG: 2 INJECTION INTRAMUSCULAR; INTRAVENOUS at 10:14

## 2021-12-30 RX ADMIN — Medication 5 MG: at 10:40

## 2021-12-30 RX ADMIN — PROPOFOL 50 MG: 10 INJECTION, EMULSION INTRAVENOUS at 10:03

## 2021-12-30 RX ADMIN — MIDAZOLAM HYDROCHLORIDE 2 MG: 1 INJECTION, SOLUTION INTRAMUSCULAR; INTRAVENOUS at 08:41

## 2021-12-30 RX ADMIN — NEOSTIGMINE METHYLSULFATE 4 MG: 1 INJECTION, SOLUTION INTRAVENOUS at 12:38

## 2021-12-30 RX ADMIN — Medication 5 MG: at 10:47

## 2021-12-30 RX ADMIN — FENTANYL CITRATE 50 MCG: 50 INJECTION, SOLUTION INTRAMUSCULAR; INTRAVENOUS at 09:56

## 2021-12-30 RX ADMIN — TAMSULOSIN HYDROCHLORIDE 0.4 MG: 0.4 CAPSULE ORAL at 17:13

## 2021-12-30 RX ADMIN — ROCURONIUM BROMIDE 50 MG: 50 INJECTION, SOLUTION INTRAVENOUS at 09:59

## 2021-12-30 RX ADMIN — OXYCODONE HYDROCHLORIDE 5 MG: 5 TABLET ORAL at 17:13

## 2021-12-30 RX ADMIN — OXYCODONE HYDROCHLORIDE 10 MG: 5 TABLET ORAL at 21:52

## 2021-12-30 RX ADMIN — CEFAZOLIN 1 G: 1 INJECTION, POWDER, FOR SOLUTION INTRAMUSCULAR; INTRAVENOUS at 17:13

## 2021-12-30 RX ADMIN — ASPIRIN 81 MG: 81 TABLET, COATED ORAL at 20:31

## 2021-12-30 RX ADMIN — PHENYLEPHRINE HYDROCHLORIDE 100 MCG: 10 INJECTION INTRAVENOUS at 10:49

## 2021-12-30 RX ADMIN — SODIUM CHLORIDE, POTASSIUM CHLORIDE, SODIUM LACTATE AND CALCIUM CHLORIDE: 600; 310; 30; 20 INJECTION, SOLUTION INTRAVENOUS at 08:34

## 2021-12-30 RX ADMIN — Medication 20 ML: at 08:49

## 2021-12-30 RX ADMIN — SODIUM CHLORIDE, POTASSIUM CHLORIDE, SODIUM LACTATE AND CALCIUM CHLORIDE: 600; 310; 30; 20 INJECTION, SOLUTION INTRAVENOUS at 16:25

## 2021-12-30 RX ADMIN — ALBUMIN HUMAN: 0.05 INJECTION, SOLUTION INTRAVENOUS at 11:02

## 2021-12-30 ASSESSMENT — MIFFLIN-ST. JEOR: SCORE: 1530.22

## 2021-12-30 ASSESSMENT — ACTIVITIES OF DAILY LIVING (ADL)
ADLS_ACUITY_SCORE: 5
ADLS_ACUITY_SCORE: 5
ADLS_ACUITY_SCORE: 7
ADLS_ACUITY_SCORE: 7
ADLS_ACUITY_SCORE: 10
ADLS_ACUITY_SCORE: 5
ADLS_ACUITY_SCORE: 7
ADLS_ACUITY_SCORE: 5
ADLS_ACUITY_SCORE: 12
ADLS_ACUITY_SCORE: 5

## 2021-12-30 NOTE — ANESTHESIA CARE TRANSFER NOTE
Patient: Malachi Domínguez    Procedure: Procedure(s):  RIGHT TOTAL SHOULDER ARTHROPLASTY WITH NO CUSTOM GUIDE       Diagnosis: Arthritis, shoulder region [M19.019]  Diagnosis Additional Information: No value filed.    Anesthesia Type:   General     Note:    Oropharynx: oropharynx clear of all foreign objects  Level of Consciousness: awake and drowsy  Oxygen Supplementation: face mask  Level of Supplemental Oxygen (L/min / FiO2): 6  Independent Airway: airway patency satisfactory and stable  Dentition: dentition unchanged  Vital Signs Stable: post-procedure vital signs reviewed and stable  Report to RN Given: handoff report given  Patient transferred to: PACU  Comments: To PACU: Arouses easily, good airway, 02 face mask, VSS  Report to RN  Handoff Report: Identifed the Patient, Identified the Reponsible Provider, Reviewed the pertinent medical history, Discussed the surgical course, Reviewed Intra-OP anesthesia mangement and issues during anesthesia, Set expectations for post-procedure period and Allowed opportunity for questions and acknowledgement of understanding      Vitals:  Vitals Value Taken Time   /62 12/30/21 1301   Temp     Pulse 72 12/30/21 1304   Resp 19 12/30/21 1304   SpO2 100 % 12/30/21 1304   Vitals shown include unvalidated device data.    Electronically Signed By: PERLA Russo CRNA  December 30, 2021  1:05 PM

## 2021-12-30 NOTE — OP NOTE
[facility]    Operative Note    Pre-operative diagnosis: Arthritis, shoulder region [M19.019]  Post-operative diagnosis Same as pre-operative diagnosis    Procedure: Procedure(s):  RIGHT TOTAL SHOULDER ARTHROPLASTY WITH NO CUSTOM GUIDE  Surgeon: Surgeon(s) and Role:     * Primitivo Lozada MD - Primary  Assistant:   Nicole Bedolla PA-C  Anesthesia: Combined General with Block   Estimated Blood Loss: 150ml    Drains: None  Specimens:   ID Type Source Tests Collected by Time Destination   1 : right humeral head Bone Biopsy Humerus, Right SURGICAL PATHOLOGY EXAM Primitivo Lozada MD 12/30/2021 11:33 AM      Findings:   None.  Complications: None.  Implants:   Implant Name Type Inv. Item Serial No.  Lot No. LRB No. Used Action   GLENOID CORTILOC MD 40MM - [id number] Total Joint Component/Insert GLENOID CORTILOC MD 40MM [id number] [employer]  Right 1 Implanted   BONE CEMENT SIMPLEX 1/2 DOSE 6188-1-001 - [id number] Cement, Bone BONE CEMENT SIMPLEX 1/2 DOSE 6188-1-001  [employer] [id number] Right 1 Implanted   STEM HUMERAL ANATOMIC STD PTC 4C - [id number] Total Joint Component/Insert STEM HUMERAL ANATOMIC STD PTC 4C [id number] [employer]  Right 1 Implanted   IMP: Tornier. Flex Shoulder system; AEQUALIS HUMERAL HEAD    [id number] [employer]  Right 1 Implanted           COMPLICATIONS:  None.     NARRATIVE:  After discussing the risks, benefits, possible complications and alternatives, the patient voiced their understanding and gave consent . The patient wished to proceed. An   interscalene block was administered. The patient was then brought to the operating room and placed in a supine position. Following administration of general anesthetic, the patient was positioned in the modified beach chair position. All bony prominences   were well-padded. A lateral chest pad was placed, and the head was secured with a Cuadra head casiano with safety goggles in place.    Following  sterile prep and drape, a standard deltopectoral incision was made and carried down through skin and soft tissue. Electrocautery was used for hemostasis. The cephalic vein was identified and taken laterally with the deltoid. It was noted to be intact throughout the case and at closure. The deltopectoral was opened and the clavipectoral fascia was incised to allow an approach to the anterior shoulder.     The biceps tendon was identified, and tenodesed at the top edge of the bicipital groove with Fiber wire figure-of-eight sutures x2.  The subscapularis insertion was inspected and deemed adequate for subscapularis tenotomy.  This was vertically incised 1cm medial to the lesser tuberosity insertion.   Above and below this, the   subscapularis and capsule were released to further mobilize the capsule and tendon off the anterior aspect of the humerus. This is retracted medially, after placing a traction sutures x2.  Juanjose retractors were used superiorly and anteriorly to further retract soft tissues, as well as to release tissue from the surgical neck past the 6 o'clock position. A small portion of the pectoralis tendon was released as well. A deltoid retractor was placed, allowing examination of the superior aspect of the head and following this, the head was delivered from the incision.     Appropriate starting point for the half moon guide was chosen 1cm posterior to the bicipital groove.  This is referenced off the anatomic head version and fully seated.  The version was checked and measured as 30.  The head was cut and set aside.  Following this, the canal  was then reamed up to resistance with a 30 reamer.  The 4 broach was placed and has a very snug fit.  There is marked discoloration of the humeral head and this will be sent to the lab for pathology examination.  This may represent ochronosis or an underlying metabolic disorder.    Attention was turned to the glenoid. A protective cap was placed over the  proximal humerus. The humerus was retracted posteriorly, using the Fukuda. A circumferential release  Of the labrum was performed beginning anteriorly, removing the stump of the biceps, proceeding superior and posterior and then along the inferior aspect, completing a 360-degree release. This shows a good straight-on approach to the glenoid can be obtained. It was then sized with different sizer disks. The medium has an excellent fit and coverage, and this was chosen. The pin was placed in what was felt to be the center of the glenoid, after marking the 4 quadrants. This was advanced, showing good fixation and over the central pin, the central anchor peg was then drilled. Peripheral pegs were then drilled, trial implant was placed and showed excellent fit, stability, and coverage. All pegs were well within bone. This was then removed, cement was mixed. The glenoid was meticulously irrigated and dried. Cement was placed at the 3 peripheral peg holes. Bone graft from the head was placed around the fins of the anchor peg. This was then fully seated and held in place until the cement fully hardened. Attention was turned to the humerus.     Trial heads were placed. It was felt that the 52x19 head provided by far the best fit, coverage and stability of the shoulder. The posterior humeral head was examined for osteophytes and these were removed. Once these had been smoothed down, this showed good fit, stability and excellent arc of motion. The trial implants were then removed.     The final implant was assembled and left on the back table.Copious irrigation was performed. The prosthesis was seated. This was checked   for stability and balance. It was felt to be well-balanced without evidence   of over-tightening. Copious irrigation was performed.  The subscapularis   was then repaired with transtendon sutures x8,and 2 figure-of-eight margin convergence sutures of the rotator interval, showing   excellent closure and good  stability. This was done with the arm in   approximately 30 degrees of abduction, and 30 degrees external rotation to avoid capturing the shoulder. The shoulder could then be easily externally rotated to  45 degrees, and passively forward flexed to  155 degrees without any tension.     The joint was then irrigated with 1 liter of dilute betadine irrigation followed by 1 liter of saline irrigation.     A total of 1 gram of Vancomycin powder was used during the case and was applied to the Humeral canal, deep joint space, deep and superficial incision.    The deltopectoral interval was closed with 0 Vicryl. The cephalic vein was noted to be intact. The subcutaneous tissue was closed with 3-0 Vicryl, skin with 4-0 Monocryl  and a Generic Prineo dressing.    The patient went to the recovery room in good condition, and will have a standard reverse total shoulder rehabilitation course.     Physicians Assistant was present from the positioning of the patient to placement of the dressings.  The PA was critical to maintain visualization, protect the neurovascular structures, and maintain the positioning of the arm.    CONDITION UPON DISCHARGE FROM OPERATION ROOM:  Stable    PLAN:  1. Antibiotic prophylaxis were given including Ancef. Abx given within 1 hr of surgical incision and discontinued within 24hrs.   2. DVT prophylaxis: aspirin and sequential compression device's will be started within 24hrs of surgery.   3. Weight Bearing NWB (Non weight bearing) right .upper extremity   4. Discharge anticipated date POD# 1 to .  5. Pain Medication Oxycodone and Dilaudid.  6. Exercises: Full elbow, wrist, and hand AROM/PROM on operative side 5x/day.  Encourage ADLs out of sling as tolerated.

## 2021-12-30 NOTE — CONSULTS
Hospitalist Consult Note  12/30/2021    This is a 61 year old male with a past medical history of BPH and anxiety. The patient presents today for an elective right total shoulder arthoplasty. Procedure uncomplicated,  ml. PTA meds have been reconciled.     Given the above, will defer formal consult. Routine post-operative cares, IVF, DVT prophylaxis, and pain management to orthopedic service. Will check some basic lab work in the AM to assess for acute blood loss anemia given surgery. PT and OT to assess per Ortho. Bowel regimen in place while on pain regimen. No changes to PTA medication regimen at discharge unless issues arise throughout stay.  Happy to be reconsulted in the future if necessary. Appreciate consult.

## 2021-12-30 NOTE — ANESTHESIA POSTPROCEDURE EVALUATION
Patient: Malachi Domínguez    Procedure: Procedure(s):  RIGHT TOTAL SHOULDER ARTHROPLASTY WITH NO CUSTOM GUIDE       Diagnosis:Arthritis, shoulder region [M19.019]  Diagnosis Additional Information: No value filed.    Anesthesia Type:  General    Note:  Disposition: Inpatient   Postop Pain Control: Uneventful            Sign Out: Well controlled pain   PONV: No   Neuro/Psych: Uneventful            Sign Out: Acceptable/Baseline neuro status   Airway/Respiratory: Uneventful            Sign Out: Acceptable/Baseline resp. status   CV/Hemodynamics: Uneventful            Sign Out: Acceptable CV status   Other NRE: NONE   DID A NON-ROUTINE EVENT OCCUR? No           Last vitals:  Vitals Value Taken Time   /67 12/30/21 1350   Temp 36.2  C (97.2  F) 12/30/21 1300   Pulse 65 12/30/21 1356   Resp 14 12/30/21 1356   SpO2 98 % 12/30/21 1356   Vitals shown include unvalidated device data.    Electronically Signed By: Jared Chang MD  December 30, 2021  1:57 PM

## 2021-12-30 NOTE — ANESTHESIA PROCEDURE NOTES
Brachial plexus Procedure Note    Pre-Procedure   Staff -        Anesthesiologist:  Jared Chang MD       Performed By: anesthesiologist       Location: pre-op       Pre-Anesthestic Checklist: patient identified, IV checked, site marked, risks and benefits discussed, informed consent, monitors and equipment checked, pre-op evaluation, at physician/surgeon's request and post-op pain management  Timeout:       Correct Patient: Yes        Correct Procedure: Yes        Correct Site: Yes        Correct Position: Yes        Correct Laterality: Yes        Site Marked: Yes  Procedure Documentation  Procedure: Brachial plexus       Laterality: right       Patient Position: supine       Patient Prep/Sterile Barriers: sterile gloves, mask, patient draped       Skin prep: Chloraprep       Local skin infiltrated with 3 mL of 1% lidocaine.  (superior trunk block approach).       Needle Type: insulated       Needle Gauge: 21.        Needle Length (Inches): 3.5        Ultrasound guided       1. Ultrasound was used to identify targeted nerve, plexus, vascular marker, or fascial plane and place a needle adjacent to it in real-time.       2. Ultrasound was used to visualize the spread of anesthetic in close proximity to the above referenced structure.       3. A permanent image is entered into the patient's record.       4. The visualized anatomic structures appeared normal.       5. There were no apparent abnormal pathologic findings.    Assessment/Narrative         The placement was negative for: blood aspirated, painful injection and site bleeding       Paresthesias: No.     Bolus given via needle..        Secured via.        Insertion/Infusion Method: Single Shot       Complications: none       Injection made incrementally with aspirations every 5 mL.    Medication(s) Administered   Ropivacaine 0.5% w/ 1:400K Epi (Injection), 20 mL  Medication Administration Time: 12/30/2021 8:49 AM

## 2021-12-30 NOTE — ANESTHESIA PREPROCEDURE EVALUATION
Anesthesia Pre-Procedure Evaluation    Patient: Malachi Domínguez   MRN: [id number] : 1960        Preoperative Diagnosis: Arthritis, shoulder region [M19.019]    Procedure : Procedure(s):  RIGHT TOTAL SHOULDER ARTHROPLASTY WITH NO CUSTOM GUIDE          Past Medical History:   Diagnosis Date     Acne      Benign prostate hyperplasia      Exercise-induced asthma      Functional scoliosis with rib asymmetry      Hepatitis C virus infection, unspecified chronicity     S/p 8 weeks of Mavyret- now viral load undetectable     Insomnia      Low back pain with sciatica      Mild recurrent major depression (H)      Olecranon bursitis of right elbow      Peripheral polyneuropathy     S/p cervical and lumbar laminectomy     Right shoulder pain       Past Surgical History:   Procedure Laterality Date     DISCECTOMY CERVICAL MINIMALLY INVASIVE ONE LEVEL Right 2017    L3-L4     ENT SURGERY      Tonsillectomy     LAMINECTOMY  2000    Multiple involves L3-S1     LAMINECTOMY      C6-7 and L4-S1     LAMINECTOMY CERIVCAL POSTERIOR TWO LEVELS           right elbow with ulnar nerve transposition and lateral epicondylitis surgery        Allergies   Allergen Reactions     Azithromycin Shortness Of Breath     Per Dr Elizalde's notes dated 17     Sulfa Drugs Rash      Social History     Tobacco Use     Smoking status: Former Smoker     Packs/day: 1.00     Years: 10.00     Pack years: 10.00     Types: Cigarettes     Smokeless tobacco: Not on file   Substance Use Topics     Alcohol use: Not Currently     Comment: Quit 20 years ago      Wt Readings from Last 1 Encounters:   21 71.3 kg (157 lb 1.6 oz)        Anesthesia Evaluation            ROS/MED HX  ENT/Pulmonary:     (+) asthma     Neurologic: Comment: LBP  H/o cervical and lumbar laminectomies      Cardiovascular:       METS/Exercise Tolerance: >4 METS    Hematologic:       Musculoskeletal:   (+) arthritis,     GI/Hepatic:     (+) hepatitis type C, liver disease,      Renal/Genitourinary:     (+) BPH,     Endo:       Psychiatric/Substance Use: Comment: H/o insomnia    (+) psychiatric history depression     Infectious Disease:       Malignancy:       Other:            Physical Exam    Airway        Mallampati: I   TM distance: > 3 FB   Neck ROM: full   Mouth opening: > 3 cm    Respiratory Devices and Support         Dental  no notable dental history         Cardiovascular   cardiovascular exam normal          Pulmonary   pulmonary exam normal                OUTSIDE LABS:  CBC: No results found for: WBC, HGB, HCT, PLT  BMP: No results found for: NA, POTASSIUM, CHLORIDE, CO2, BUN, CR, GLC  COAGS: No results found for: PTT, INR, FIBR  POC:   Lab Results   Component Value Date    HCGS Negative 07/29/2021     HEPATIC: No results found for: ALBUMIN, PROTTOTAL, ALT, AST, GGT, ALKPHOS, BILITOTAL, BILIDIRECT, TOSHA  OTHER: No results found for: PH, LACT, A1C, GWENDOLYN, PHOS, MAG, LIPASE, AMYLASE, TSH, T4, T3, CRP, SED    Anesthesia Plan    ASA Status:  2      Anesthesia Type: General.     - Airway: ETT   Induction: Intravenous, Propofol.   Maintenance: Balanced.        Consents    Anesthesia Plan(s) and associated risks, benefits, and realistic alternatives discussed. Questions answered and patient/representative(s) expressed understanding.    - Discussed:     - Discussed with:  Patient         Postoperative Care    Pain management: IV analgesics, Multi-modal analgesia, Peripheral nerve block (Single Shot).   PONV prophylaxis: Ondansetron (or other 5HT-3), Dexamethasone or Solumedrol, Background Propofol Infusion     Comments:                Jared Chang MD

## 2021-12-31 ENCOUNTER — APPOINTMENT (OUTPATIENT)
Dept: OCCUPATIONAL THERAPY | Facility: CLINIC | Age: 61
End: 2021-12-31
Attending: ORTHOPAEDIC SURGERY
Payer: COMMERCIAL

## 2021-12-31 VITALS
HEART RATE: 73 BPM | OXYGEN SATURATION: 94 % | HEIGHT: 71 IN | BODY MASS INDEX: 21.55 KG/M2 | RESPIRATION RATE: 16 BRPM | SYSTOLIC BLOOD PRESSURE: 135 MMHG | TEMPERATURE: 99.6 F | WEIGHT: 153.9 LBS | DIASTOLIC BLOOD PRESSURE: 89 MMHG

## 2021-12-31 LAB
FASTING STATUS PATIENT QL REPORTED: ABNORMAL
GLUCOSE BLD-MCNC: 115 MG/DL (ref 70–99)
HGB BLD-MCNC: 13.8 G/DL (ref 13.3–17.7)

## 2021-12-31 PROCEDURE — 250N000013 HC RX MED GY IP 250 OP 250 PS 637: Performed by: STUDENT IN AN ORGANIZED HEALTH CARE EDUCATION/TRAINING PROGRAM

## 2021-12-31 PROCEDURE — 82947 ASSAY GLUCOSE BLOOD QUANT: CPT | Performed by: ORTHOPAEDIC SURGERY

## 2021-12-31 PROCEDURE — 250N000011 HC RX IP 250 OP 636: Performed by: STUDENT IN AN ORGANIZED HEALTH CARE EDUCATION/TRAINING PROGRAM

## 2021-12-31 PROCEDURE — 250N000013 HC RX MED GY IP 250 OP 250 PS 637: Performed by: PHYSICIAN ASSISTANT

## 2021-12-31 PROCEDURE — 36415 COLL VENOUS BLD VENIPUNCTURE: CPT | Performed by: ORTHOPAEDIC SURGERY

## 2021-12-31 PROCEDURE — 97535 SELF CARE MNGMENT TRAINING: CPT | Mod: GO | Performed by: OCCUPATIONAL THERAPIST

## 2021-12-31 PROCEDURE — 85018 HEMOGLOBIN: CPT | Performed by: STUDENT IN AN ORGANIZED HEALTH CARE EDUCATION/TRAINING PROGRAM

## 2021-12-31 PROCEDURE — 97110 THERAPEUTIC EXERCISES: CPT | Mod: GO | Performed by: OCCUPATIONAL THERAPIST

## 2021-12-31 PROCEDURE — 97165 OT EVAL LOW COMPLEX 30 MIN: CPT | Mod: GO | Performed by: OCCUPATIONAL THERAPIST

## 2021-12-31 RX ORDER — HYDROMORPHONE HYDROCHLORIDE 2 MG/1
2-4 TABLET ORAL
Status: DISCONTINUED | OUTPATIENT
Start: 2021-12-31 | End: 2021-12-31 | Stop reason: HOSPADM

## 2021-12-31 RX ORDER — AMOXICILLIN 250 MG
1-2 CAPSULE ORAL 2 TIMES DAILY
Qty: 30 TABLET | Refills: 0 | Status: SHIPPED | OUTPATIENT
Start: 2021-12-31

## 2021-12-31 RX ORDER — DIAZEPAM 2 MG
2 TABLET ORAL EVERY 6 HOURS PRN
Status: DISCONTINUED | OUTPATIENT
Start: 2021-12-31 | End: 2021-12-31 | Stop reason: HOSPADM

## 2021-12-31 RX ORDER — HYDROMORPHONE HYDROCHLORIDE 2 MG/1
2-4 TABLET ORAL
Qty: 35 TABLET | Refills: 0 | Status: SHIPPED | OUTPATIENT
Start: 2021-12-31 | End: 2023-11-07

## 2021-12-31 RX ORDER — AMOXICILLIN 250 MG
1-2 CAPSULE ORAL 2 TIMES DAILY
Qty: 30 TABLET | Refills: 0 | Status: SHIPPED | OUTPATIENT
Start: 2021-12-31 | End: 2021-12-31

## 2021-12-31 RX ORDER — ACETAMINOPHEN 325 MG/1
650 TABLET ORAL EVERY 4 HOURS PRN
Qty: 100 TABLET | Refills: 0 | Status: SHIPPED | OUTPATIENT
Start: 2021-12-31 | End: 2023-11-07

## 2021-12-31 RX ORDER — ACETAMINOPHEN 325 MG/1
650 TABLET ORAL EVERY 4 HOURS PRN
Qty: 100 TABLET | Refills: 0 | Status: SHIPPED | OUTPATIENT
Start: 2021-12-31 | End: 2021-12-31

## 2021-12-31 RX ORDER — ASPIRIN 81 MG/1
81 TABLET ORAL 2 TIMES DAILY
Qty: 60 TABLET | Refills: 0 | Status: SHIPPED | OUTPATIENT
Start: 2021-12-31 | End: 2021-12-31

## 2021-12-31 RX ORDER — OXYCODONE HYDROCHLORIDE 5 MG/1
5-10 TABLET ORAL
Qty: 30 TABLET | Refills: 0 | Status: SHIPPED | OUTPATIENT
Start: 2021-12-31 | End: 2021-12-31

## 2021-12-31 RX ORDER — ASPIRIN 81 MG/1
81 TABLET ORAL 2 TIMES DAILY
Qty: 60 TABLET | Refills: 0 | Status: SHIPPED | OUTPATIENT
Start: 2021-12-31

## 2021-12-31 RX ADMIN — FLUTICASONE PROPIONATE 1 PUFF: 44 AEROSOL, METERED RESPIRATORY (INHALATION) at 09:06

## 2021-12-31 RX ADMIN — SENNOSIDES AND DOCUSATE SODIUM 1 TABLET: 50; 8.6 TABLET ORAL at 09:05

## 2021-12-31 RX ADMIN — HYDROMORPHONE HYDROCHLORIDE 0.4 MG: 0.2 INJECTION, SOLUTION INTRAMUSCULAR; INTRAVENOUS; SUBCUTANEOUS at 05:43

## 2021-12-31 RX ADMIN — HYDROMORPHONE HYDROCHLORIDE 0.4 MG: 0.2 INJECTION, SOLUTION INTRAMUSCULAR; INTRAVENOUS; SUBCUTANEOUS at 03:22

## 2021-12-31 RX ADMIN — TAMSULOSIN HYDROCHLORIDE 0.4 MG: 0.4 CAPSULE ORAL at 09:05

## 2021-12-31 RX ADMIN — HYDROMORPHONE HYDROCHLORIDE 4 MG: 2 TABLET ORAL at 11:03

## 2021-12-31 RX ADMIN — OXYCODONE HYDROCHLORIDE 10 MG: 5 TABLET ORAL at 02:08

## 2021-12-31 RX ADMIN — HYDROXYZINE HYDROCHLORIDE 25 MG: 25 TABLET ORAL at 05:42

## 2021-12-31 RX ADMIN — ACETAMINOPHEN 975 MG: 325 TABLET, FILM COATED ORAL at 05:41

## 2021-12-31 RX ADMIN — CEFAZOLIN 1 G: 1 INJECTION, POWDER, FOR SOLUTION INTRAMUSCULAR; INTRAVENOUS at 00:59

## 2021-12-31 RX ADMIN — ACETAMINOPHEN 975 MG: 325 TABLET, FILM COATED ORAL at 12:04

## 2021-12-31 RX ADMIN — HYDROMORPHONE HYDROCHLORIDE 4 MG: 2 TABLET ORAL at 07:48

## 2021-12-31 RX ADMIN — DIAZEPAM 2 MG: 2 TABLET ORAL at 12:04

## 2021-12-31 RX ADMIN — DULOXETINE HYDROCHLORIDE 40 MG: 20 CAPSULE, DELAYED RELEASE ORAL at 09:04

## 2021-12-31 RX ADMIN — HYDROMORPHONE HYDROCHLORIDE 4 MG: 2 TABLET ORAL at 14:07

## 2021-12-31 RX ADMIN — ASPIRIN 81 MG: 81 TABLET, COATED ORAL at 09:04

## 2021-12-31 NOTE — PLAN OF CARE
Occupational Therapy Discharge Summary    Reason for therapy discharge:    All goals and outcomes met, no further needs identified.    Progress towards therapy goal(s). See goals on Care Plan in [facility] electronic health record for goal details.  Goals met    Therapy recommendation(s):    No further therapy is recommended. Pt has met IP OT goals, anticipate he will progress with OP therapy per MD orders.

## 2021-12-31 NOTE — PROGRESS NOTES
[facility]      OUTPATIENT OCCUPATIONAL THERAPY  EVALUATION  PLAN OF TREATMENT FOR OUTPATIENT REHABILITATION  (COMPLETE FOR INITIAL CLAIMS ONLY)  Patient's Last Name, First Name, M.I.  YOB: 1960  Malachi Domínguez                          Provider's Name  [facility] Medical Record No.  [id number]                               Onset Date:  12/30/21   Start of Care Date:  12/31/21     Type:     ___PT   _X_OT   ___SLP Medical Diagnosis:  right shoulder replacement                        OT Diagnosis:  decreased independence in ADLS and IADLS   Visits from SOC:  1   _________________________________________________________________________________  Plan of Treatment/Functional Goals    Planned Interventions: ADL retraining,ROM,home program guidelines,progressive activity/exercise   Goals: See Occupational Therapy Goals on Care Plan in Send [employer] electronic health record.    Therapy Frequency: 2x/day  Predicted Duration of Therapy Intervention: 2 days  _________________________________________________________________________________    I CERTIFY THE NEED FOR THESE SERVICES FURNISHED UNDER        THIS PLAN OF TREATMENT AND WHILE UNDER MY CARE     (Physician co-signature of this document indicates review and certification of the therapy plan).                Certification date from: 12/31/21, Certification date to: 01/02/22    Referring Physician: Nicole Bedolla            Initial Assessment        See Occupational Therapy evaluation dated 12/31/21 in Epic electronic health record.

## 2021-12-31 NOTE — PROGRESS NOTES
.Patient vital signs are at baseline: Yes  Patient able to ambulate as they were prior to admission or with assist devices provided by therapies during their stay: yes  Patient MUST void prior to discharge:  Yes  Patient able to tolerate oral intake: yes  Pain has adequate pain control using Oral analgesics: yes

## 2021-12-31 NOTE — PROGRESS NOTES
"ORTHOPEDIC UPPER EXTREMITY PROGRESS NOTE    POD# 1  Patient is a 61 year old male who underwent Procedure(s):  RIGHT TOTAL SHOULDER ARTHROPLASTY WITH NO CUSTOM GUIDE on 12/30/2021 on right. Pain is very high, wasn't controlled overnight as soon as block wore off.  Very uncomfortable right now.    Vitals:   /89 (BP Location: Left arm)   Pulse 73   Temp 99.6  F (37.6  C) (Oral)   Resp 16   Ht 1.803 m (5' 11\")   Wt 69.8 kg (153 lb 14.4 oz)   SpO2 94%   BMI 21.46 kg/m        EXAM   The patient is awake and alert, uncomfortable in bed.   Sensation is intact.  Digital Flexion/Extension maintained.   Brisk cap refill.   The incision is covered, prineo intact and clean.     Labs: Recent Labs   Lab Test 12/31/21  0733 12/30/21  0750   HGB 13.8 15.3       ASSESSMENT  S/p right reverse TSA   PLAN  1. Shoulder immobilizer in place, slightly large but fit is tolerable  2. Weight Bearing NWB  3. Wound Care leave undisturbed  4. Discharge anticipated date today once pain controlled   5. Cont Pain Control will try dilaudid    Kjerstin Foss PA-C [facility]    "

## 2021-12-31 NOTE — PROGRESS NOTES
Patient vital signs are at baseline: Yes  Patient able to ambulate as they were prior to admission or with assist devices provided by therapies during their stay:  YES, patient walker [address]    Patient MUST void prior to discharge:  Yes  Patient able to tolerate oral intake:  Yes  Pain has adequate pain control using Oral analgesics: NO Reason Required Iv Dilaudid for break through .

## 2021-12-31 NOTE — PROGRESS NOTES
12/31/21 0847   Quick Adds   Type of Visit Initial Occupational Therapy Evaluation   Living Environment   People in home alone   Current Living Arrangements house  (story and a half)   Home Accessibility stairs to enter home   Number of Stairs, Main Entrance 4   Stair Railings, Main Entrance none   Transportation Anticipated family or friend will provide   Living Environment Comments pt able to meet all needs on main level of home   Self-Care   Usual Activity Tolerance good   Current Activity Tolerance fair   Equipment Currently Used at Home none   Activity/Exercise/Self-Care Comment pt reports being independent with basic ADLS   Disability/Function   Hearing Difficulty or Deaf yes   Describe hearing loss hearing loss on left side   Use of hearing assistive devices none   Were auxiliary aids offered? yes   The following aids were provided; patient declined offer of auxiliary aids   Wear Glasses or Blind yes   Vision Management glasses   Concentrating, Remembering or Making Decisions Difficulty no   Difficulty Communicating no   Difficulty Eating/Swallowing no   Walking or Climbing Stairs Difficulty no   Dressing/Bathing Difficulty no   Toileting issues no   Doing Errands Independently Difficulty (such as shopping) no   Fall history within last six months no   Change in Functional Status Since Onset of Current Illness/Injury no   General Information   Onset of Illness/Injury or Date of Surgery 12/30/21   Referring Physician Nicole Bedolla   Patient/Family Therapy Goal Statement (OT) return home   Additional Occupational Profile Info/Pertinent History of Current Problem Pt is a 61 year old male admitted for a right shoulder replacement.  Pt is right handed.  PMH includes DM, CAD, renal disease, GERD, ADOLFO, thromboemgolic problems, clotting disorder, impaired immunity   Performance Patterns (Routines, Roles, Habits) pt reports being independent at home.  Has family and friends in town that can help him   Existing  Precautions/Restrictions shoulder   General Observations and Info pt in bed, willing to participate   Cognitive Status Examination   Orientation Status orientation to person, place and time   Affect/Mental Status (Cognitive) WNL   Visual Perception   Visual Impairment/Limitations corrective lenses full-time   Pain Assessment   Patient Currently in Pain Yes, see Vital Sign flowsheet   Range of Motion Comprehensive   General Range of Motion no range of motion deficits identified   Comment, General Range of Motion surgical limitations for right shoulder   Strength Comprehensive (MMT)   General Manual Muscle Testing (MMT) Assessment no strength deficits identified   Coordination   Upper Extremity Coordination No deficits were identified   Bed Mobility   Bed Mobility scooting/bridging;supine-sit;sit-supine   Scooting/Bridging [place] (Bed Mobility) supervision;verbal cues;contact guard   Supine-Sit [place] (Bed Mobility) supervision;verbal cues;contact guard   Sit-Supine [place] (Bed Mobility) supervision;verbal cues;contact guard   Assistive Device (Bed Mobility) bed rails   Clinical Impression   Criteria for Skilled Therapeutic Interventions Met (OT) yes   OT Diagnosis decreased independence in ADLS and IADLS   OT Problem List-Impairments impacting ADL activity tolerance impaired;range of motion (ROM);pain;post-surgical precautions   Assessment of Occupational Performance 3-5 Performance Deficits   Identified Performance Deficits decreased independence in dressing, bathing, household chores, leisure tasks   Planned Therapy Interventions (OT) ADL retraining;ROM;home program guidelines;progressive activity/exercise   Clinical Decision Making Complexity (OT) low complexity   Therapy Frequency (OT) 2x/day   Predicted Duration of Therapy 2 days   Risk & Benefits of therapy have been explained evaluation/treatment results reviewed;care plan/treatment goals reviewed;patient   OT Discharge Planning    OT  Discharge Recommendation (DC Rec) Home with assist   OT Rationale for DC Rec OT: Pt had good initial movement for bed mobility and one handed dressisng technqiues at our first session.  Able to complete right UE exercises per shoulder precautions.  Reports having family and friends to help, all needs on main floor of home once he gets inside.  Would benefit from skilled OT to increase independence and endurance in ADLS using appropriate shoulder precuations for exercise and one handed technqiues.  Anticipate pt will be able to go home with prograss and assist from family and friends   Total Evaluation Time (Minutes)   Total Evaluation Time (Minutes) 15

## 2021-12-31 NOTE — PLAN OF CARE
R shoulder POD 1    Full code. Reg diet. Ax1. New IV site on L forearm as it got pulled out accidentally in early am. Saline locked, no more fluides as pt can swallow and consume fluids orally. Voiding in urinal. 700 output for my shift. Lots of pain steady around 6-9/10 even with oxy,dilaudid and atarax. Used lots of ice for relief. PCD on. Hgb check at 6am. Got up in the evening to walk around room. CMS intact, no more tingling or numbness in fingertips.

## 2021-12-31 NOTE — PLAN OF CARE
Patient vital signs are at baseline: Yes  Patient able to ambulate as they were prior to admission or with assist devices provided by therapies during their stay:  Yes  Patient MUST void prior to discharge:  Yes  Patient able to tolerate oral intake:  Yes  Pain has adequate pain control using Oral analgesics:  Yes    Dressing JOHN.  Pain managed with tylenol and oral dilaudid.  Up SBA and GB.  Discharge instruction went over with pt, verbalized understanding. Will discharge home around 1500.

## 2021-12-31 NOTE — PROGRESS NOTES
Patient vital signs are at baseline: Yes  Patient able to ambulate as they were prior to admission or with assist devices provided by therapies during their stay:  No,  Reason:  Not out of bed yet  Patient MUST void prior to discharge:  Yes  Patient able to tolerate oral intake:  Yes  Pain has adequate pain control using Oral analgesics:  Yes

## 2022-01-05 LAB
PATH REPORT.COMMENTS IMP SPEC: NORMAL
PATH REPORT.COMMENTS IMP SPEC: NORMAL
PATH REPORT.FINAL DX SPEC: NORMAL
PATH REPORT.GROSS SPEC: NORMAL
PATH REPORT.MICROSCOPIC SPEC OTHER STN: NORMAL
PATH REPORT.RELEVANT HX SPEC: NORMAL
PHOTO IMAGE: NORMAL

## 2022-10-30 ENCOUNTER — HEALTH MAINTENANCE LETTER (OUTPATIENT)
Age: 62
End: 2022-10-30

## 2023-11-02 ENCOUNTER — TELEPHONE (OUTPATIENT)
Dept: PULMONOLOGY | Facility: CLINIC | Age: 63
End: 2023-11-02
Payer: COMMERCIAL

## 2023-11-06 DIAGNOSIS — J84.9 ILD (INTERSTITIAL LUNG DISEASE) (H): Primary | ICD-10-CM

## 2023-11-07 ENCOUNTER — LAB (OUTPATIENT)
Dept: LAB | Facility: CLINIC | Age: 63
End: 2023-11-07
Payer: COMMERCIAL

## 2023-11-07 ENCOUNTER — OFFICE VISIT (OUTPATIENT)
Dept: PULMONOLOGY | Facility: CLINIC | Age: 63
End: 2023-11-07
Attending: INTERNAL MEDICINE
Payer: COMMERCIAL

## 2023-11-07 ENCOUNTER — ANCILLARY PROCEDURE (OUTPATIENT)
Dept: CT IMAGING | Facility: CLINIC | Age: 63
End: 2023-11-07
Attending: INTERNAL MEDICINE
Payer: COMMERCIAL

## 2023-11-07 VITALS
SYSTOLIC BLOOD PRESSURE: 138 MMHG | BODY MASS INDEX: 20.22 KG/M2 | WEIGHT: 141.2 LBS | HEART RATE: 56 BPM | RESPIRATION RATE: 16 BRPM | OXYGEN SATURATION: 100 % | DIASTOLIC BLOOD PRESSURE: 85 MMHG | HEIGHT: 70 IN

## 2023-11-07 DIAGNOSIS — J84.9 ILD (INTERSTITIAL LUNG DISEASE) (H): ICD-10-CM

## 2023-11-07 DIAGNOSIS — J84.10 PULMONARY FIBROSIS (H): Primary | ICD-10-CM

## 2023-11-07 DIAGNOSIS — J84.10 PULMONARY FIBROSIS (H): ICD-10-CM

## 2023-11-07 LAB
6 MIN WALK (FT): 1540 FT
6 MIN WALK (M): 469 M
CK SERPL-CCNC: 85 U/L (ref 39–308)
CRP SERPL-MCNC: <3 MG/L
ERYTHROCYTE [SEDIMENTATION RATE] IN BLOOD BY WESTERGREN METHOD: 10 MM/HR (ref 0–20)
IGG SERPL-MCNC: 717 MG/DL (ref 610–1616)
IGG1 SER-MCNC: 439 MG/DL (ref 382–929)
IGG2 SER-MCNC: 151 MG/DL (ref 242–700)
IGG3 SER-MCNC: 106 MG/DL (ref 22–176)
IGG4 SER-MCNC: 7 MG/DL (ref 4–86)
RHEUMATOID FACT SER NEPH-ACNC: <6 IU/ML
SUBCLASSES, PERCENT: 98 %

## 2023-11-07 PROCEDURE — 86235 NUCLEAR ANTIGEN ANTIBODY: CPT | Performed by: INTERNAL MEDICINE

## 2023-11-07 PROCEDURE — 86606 ASPERGILLUS ANTIBODY: CPT | Mod: 90 | Performed by: PATHOLOGY

## 2023-11-07 PROCEDURE — 86331 IMMUNODIFFUSION OUCHTERLONY: CPT | Mod: 90 | Performed by: PATHOLOGY

## 2023-11-07 PROCEDURE — 99207 PR NO CHARGE LOS: CPT

## 2023-11-07 PROCEDURE — 85652 RBC SED RATE AUTOMATED: CPT | Performed by: PATHOLOGY

## 2023-11-07 PROCEDURE — 86140 C-REACTIVE PROTEIN: CPT | Performed by: PATHOLOGY

## 2023-11-07 PROCEDURE — 86200 CCP ANTIBODY: CPT | Performed by: INTERNAL MEDICINE

## 2023-11-07 PROCEDURE — 99000 SPECIMEN HANDLING OFFICE-LAB: CPT | Performed by: PATHOLOGY

## 2023-11-07 PROCEDURE — 36415 COLL VENOUS BLD VENIPUNCTURE: CPT | Performed by: PATHOLOGY

## 2023-11-07 PROCEDURE — 99205 OFFICE O/P NEW HI 60 MIN: CPT | Mod: 25 | Performed by: INTERNAL MEDICINE

## 2023-11-07 PROCEDURE — 94729 DIFFUSING CAPACITY: CPT | Performed by: INTERNAL MEDICINE

## 2023-11-07 PROCEDURE — 86038 ANTINUCLEAR ANTIBODIES: CPT | Performed by: INTERNAL MEDICINE

## 2023-11-07 PROCEDURE — 82085 ASSAY OF ALDOLASE: CPT | Mod: 90 | Performed by: PATHOLOGY

## 2023-11-07 PROCEDURE — 94375 RESPIRATORY FLOW VOLUME LOOP: CPT | Performed by: INTERNAL MEDICINE

## 2023-11-07 PROCEDURE — 94726 PLETHYSMOGRAPHY LUNG VOLUMES: CPT | Performed by: INTERNAL MEDICINE

## 2023-11-07 PROCEDURE — 82550 ASSAY OF CK (CPK): CPT | Performed by: PATHOLOGY

## 2023-11-07 PROCEDURE — 86431 RHEUMATOID FACTOR QUANT: CPT | Performed by: INTERNAL MEDICINE

## 2023-11-07 PROCEDURE — 71250 CT THORAX DX C-: CPT | Mod: GC | Performed by: RADIOLOGY

## 2023-11-07 PROCEDURE — 82784 ASSAY IGA/IGD/IGG/IGM EACH: CPT | Performed by: INTERNAL MEDICINE

## 2023-11-07 PROCEDURE — 86037 ANCA TITER EACH ANTIBODY: CPT | Performed by: INTERNAL MEDICINE

## 2023-11-07 PROCEDURE — G0463 HOSPITAL OUTPT CLINIC VISIT: HCPCS | Performed by: INTERNAL MEDICINE

## 2023-11-07 RX ORDER — NAPROXEN 500 MG/1
500 TABLET ORAL PRN
COMMUNITY

## 2023-11-07 RX ORDER — FEXOFENADINE HCL 180 MG/1
180 TABLET ORAL DAILY
COMMUNITY

## 2023-11-07 RX ORDER — OXYBUTYNIN CHLORIDE 10 MG/1
TABLET, EXTENDED RELEASE ORAL
COMMUNITY

## 2023-11-07 ASSESSMENT — PAIN SCALES - GENERAL: PAINLEVEL: MILD PAIN (2)

## 2023-11-07 NOTE — PROGRESS NOTES
[facility] Interstitial Lung Disease Program          Date of Visit: 11/07/23   Clinic Location: [facility]      ASSESSMENT:  ILD: Victorino has evidence of mild pulmonary fibrosis based on review of high-resolution CT scan imaging.  He has no pulmonary function limitation, is asymptomatic, and did not experience any desaturation on the 6-minute walk test today.  Imaging characteristics suggestive of early UIP, although no characteristic features yet.  No evidence of an alternate diagnosis currently but will need follow-up of imaging.  No characteristic features of pneumoconiosis although does have some exposure history.  Minocycline in very rare situations can cause interstitial lung disease, although this tends to be an acute form of interstitial pneumonia.  It is unlikely that his interstitial lung disease is secondary to Minocycline, but he is already off this medication and I recommended him to abstain from using it if he is okay with it.  Disease monitoring: Victorino has mild pulmonary fibrosis with no limitation of pulmonary function and no desaturations noted on the 6-minute walk today.  For further evaluation for an alternate diagnosis, I have ordered extended ILD panel to rule out autoimmune disease or hypersensitivity pneumonitis, although both of these seem unlikely. we will see him back in 12 months with full PFT and a 6-minute walk test.  Pulmonary hypertension: Currently low suspicion.  No indication for echocardiogram.  Immunomodulatory therapy and monitoring: Not indicated  Antifibrotic therapy: With mild pulmonary fibrosis and normal pulmonary function, will hold off on initiating antifibrotic therapy although we discussed briefly the 2 agents and there benefits versus adverse effects profile.   Oxygen supplementation: Currently not indicated  Pulmonary rehabilitation: Currently not indicated  Lung Transplantation: Currently not indicated  GERD therapy: No indication for PPI or H2  blocker  History of asthma: Seems to be well controlled.  He may have mild emphysema based on my interpretation of his CT scan imaging today.  In any case, he has not had to use albuterol for rescue purposes.  Currently, he is not using Flovent which was prescribed for daily use as well.  Discussed that he can continue to use albuterol for emergency purposes but if he has daily symptoms, may need to consider using Flovent on a daily basis-he can discuss this further with Dr. Florez.   Other issues:  History of tobaco use-quit 1995  History of crack cocaine ( IV)-remote  History of chronic minocyline use ( acne)  I spent a total of  62 minutes reviewing chart (previous notes), reviewing test results, reviewing chest x rays and CT scans, talking with and examining patient, formulating plan, adjusting medications, and documentation of my findings and plan on the day of the encounter. Time excludes time spent for PFT.   Buster Hurt MD [facility]  Associate Professor of Medicine  Division of Pulmonary, Allergy & Critical Care   [facility]  [facility]      Chief Complaint:   Malachi Domínguez is a 63 year old year old male who is being seen for Consult (New Interstitial Lung )    HPI    Malachi Domínguez is a 63 year old  year old male who presents for evaluation and management of suspected ILD. Briefly, Malachi Domínguez has a history of long-standing asthma for which he follows with Dr Keny Florez at [place]. He has been maintained on Flovent inhaler for a long time although he admits he has not been using it consistently.  Victorino reports that around 3 months ago he started noticing more episodes of coughing, intermittent wheezing and worsening shortness of breath.  He also had significant runny nose and chest congestion.  He denied fever, chills over the last few months.  He has never required oxygen.  Per records, for the symptoms, a CT scan of the chest was  obtained which was reportedly suspicious for interstitial lung disease, and hence the referral to our clinic today.  Today, Victorino reports that his symptoms have resolved over the last 1 month.  He does not have significant coughing.  He continues to feel congested intermittently.  He quit taking minocycline as was advised by Dr Florez which she had been taking for a long time for acne.  He is not using his albuterol or Flovent anymore.  He feels his breathing is back to baseline.  He denies any limitation with activity as far as his breathing is concerned and continues to remain very active.      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 He used to work as a , did have some exposure to asbestos and quartz ( transient and not long-term).    Other ( welding, hard metal etc, coffee, mushroom,wood): No    Smoking ( tobacco, marijuana, e-cigarettes, vaping, others): IV cocaine ( 30 years ago), edible marijuana now ( off an on, 5 mg thc,5 mg CBD).  No inhalational exposure anymore.   Drugs (Amiodarone, Bleomycin, Nitrofuranoin, Radiation, Immunotherapy): No  Mold/mildew ( flood, musty basement): No  Birds/ bird droppings (pigeons, doves, parakeets, cockaties, chickens, ducks, geese): No  Feather pillow/blanket/down comforter/ jackets: No  Hot tub/jacuzzi/sauna: No  Humidifier: No  Hobbies- woodworking, brass or woodwind instruments, pottery, gardening: No  Chemotherapy: No    ROS: 12 point ROS negative except mentioned in HPI.    Rheumatic ROS: No dry eyes, dry mouth, raynauds, photosensitivity, joint stiffness/pain > 1 hour, blood in urine        Past Medical History:   Diagnosis Date    Acne     Benign prostate hyperplasia     Exercise-induced asthma     Functional scoliosis with rib asymmetry     Hepatitis C virus infection, unspecified chronicity     S/p 8 weeks of Mavyret- now viral load undetectable    Insomnia     Low  "back pain with sciatica     Mild recurrent major depression (H24)     Olecranon bursitis of right elbow     Peripheral polyneuropathy     S/p cervical and lumbar laminectomy    Right shoulder pain               Past Surgical History:   Procedure Laterality Date    ARTHROPLASTY SHOULDER Right 2021    Procedure: RIGHT TOTAL SHOULDER ARTHROPLASTY WITH NO CUSTOM GUIDE;  Surgeon: Primitivo Lozada MD;  Location: [facility] OR    DISCECTOMY CERVICAL MINIMALLY INVASIVE ONE LEVEL Right 2017    L3-L4    ENT SURGERY      Tonsillectomy    LAMINECTOMY  2000    Multiple involves L3-S1    LAMINECTOMY      C6-7 and L4-S1    LAMINECTOMY CERIVCAL POSTERIOR TWO LEVELS          right elbow with ulnar nerve transposition and lateral epicondylitis surgery          Social History     Tobacco Use    Smoking status: Former     Packs/day: 1.00     Years: 10.00     Additional pack years: 0.00     Total pack years: 10.00     Types: Cigarettes     Start date: 1978     Quit date: 1998     Years since quittin.8    Smokeless tobacco: Never   Substance Use Topics    Alcohol use: Not Currently     Comment: Quit 20 years ago    Drug use: Not Currently     Types: \"Crack\" cocaine     Comment: Was a IV drug user- used cocaine 30 years ago                  Family History   Problem Relation Age of Onset    Prostate Cancer Father     Breast Cancer Mother     Skin Cancer Sister           Any family history of ILD:        Current Outpatient Medications   Medication    DULoxetine HCl 40 MG CPEP    fexofenadine (ALLEGRA) 180 MG tablet    fluticasone (FLONASE) 50 MCG/ACT nasal spray    naproxen (NAPROSYN) 500 MG tablet    oxyBUTYnin ER (DITROPAN XL) 10 MG 24 hr tablet    Silodosin (RAPAFLO) 8 MG CAPS capsule    TESTOSTERONE ENANTHATE IJ    albuterol (PROAIR HFA/PROVENTIL HFA/VENTOLIN HFA) 108 (90 Base) MCG/ACT inhaler    ASHWAGANDHA PO    aspirin 81 MG EC tablet    Chorionic Gonadotropin (HCG IJ)    fexofenadine-pseudoePHEDrine " "(ALLEGRA-D 24) 180-240 MG 24 hr tablet    fluticasone (FLOVENT HFA) 44 MCG/ACT inhaler    Melatonin 10 MG TBDP    minocycline (MINOCIN) 100 MG capsule    senna-docusate (SENOKOT-S/PERICOLACE) 8.6-50 MG tablet     No current facility-administered medications for this visit.                        Allergies   Allergen Reactions    Cats     Dogs     Seasonal Allergies     Sulfa Antibiotics Rash                 /85   Pulse 56   Resp 16   Ht 1.778 m (5' 10\")   Wt 64 kg (141 lb 3.2 oz)   SpO2 100%   BMI 20.26 kg/m       Body mass index is 20.26 kg/m .        Physical Examination    General: Well developed, well nourished, No apparent distress  Eyes: Anicteric  Nose: Nasal mucosa with no edema or hyperemia.  No polyps  Ears: Hearing grossly normal  Mouth: Oral mucosa is moist, without any lesions. No oropharyngeal exudate.  Respiratory: Good air movement.  Scattered basilar crackles, clears with coughing,  no rhonchi. No wheezes  Cardiac: RRR, normal S1, S2. No murmurs. No JVD  Abdomen: Soft, NT/ND  Musculoskeletal: Extremities normal. No clubbing. No cyanosis. No edema.  Skin: No rash on limited exam  Neuro: Normal mentation. Normal speech.  Psych:Normal affect         RESULTS:  Serologies:  11/2/23 (outside): Negative rheumatoid factor  PFT interpretation: I personally reviewed and interpreted the PFTs-normal spirometry, normal lung volumes and normal diffusion capacity.   Echocardiogram: Not available.    Chest imaging:    High-resolution CT chest 11/7/2023:    Official read is pending-my interpretation suggest presence of mild subpleural reticulation at the  bilateral lungs, apical basal, scattered micronodules.  No evidence of traction bronchiectasis or honeycombing.      I personally reviewed and interpreted the chest radiographs.       "

## 2023-11-07 NOTE — NURSING NOTE
Chief Complaint   Patient presents with    Consult     New Interstitial Lung     Medications reviewed and vital signs taken.   Keny Stoddard, NATALY

## 2023-11-07 NOTE — LETTER
11/7/2023         RE: Malachi Domínguez  [address]  [facility] [id number]        Dear Colleague,    Thank you for referring your patient, Malachi Domínguez, to the [facility]. Please see a copy of my visit note below.      [facility] Interstitial Lung Disease Program          Date of Visit: 11/07/23   Clinic Location: [facility]      ASSESSMENT:  ILD: Victorino has evidence of mild pulmonary fibrosis based on review of high-resolution CT scan imaging.  He has no pulmonary function limitation, is asymptomatic, and did not experience any desaturation on the 6-minute walk test today.  Imaging characteristics suggestive of early UIP, although no characteristic features yet.  No evidence of an alternate diagnosis currently but will need follow-up of imaging.  No characteristic features of pneumoconiosis although does have some exposure history.  Minocycline in very rare situations can cause interstitial lung disease, although this tends to be an acute form of interstitial pneumonia.  It is unlikely that his interstitial lung disease is secondary to Minocycline, but he is already off this medication and I recommended him to abstain from using it if he is okay with it.  Disease monitoring: Victorino has mild pulmonary fibrosis with no limitation of pulmonary function and no desaturations noted on the 6-minute walk today.  For further evaluation for an alternate diagnosis, I have ordered extended ILD panel to rule out autoimmune disease or hypersensitivity pneumonitis, although both of these seem unlikely. we will see him back in 12 months with full PFT and a 6-minute walk test.  Pulmonary hypertension: Currently low suspicion.  No indication for echocardiogram.  Immunomodulatory therapy and monitoring: Not indicated  Antifibrotic therapy: With mild pulmonary fibrosis and normal pulmonary function, will hold off on initiating  antifibrotic therapy although we discussed briefly the 2 agents and there benefits versus adverse effects profile.   Oxygen supplementation: Currently not indicated  Pulmonary rehabilitation: Currently not indicated  Lung Transplantation: Currently not indicated  GERD therapy: No indication for PPI or H2 blocker  History of asthma: Seems to be well controlled.  He may have mild emphysema based on my interpretation of his CT scan imaging today.  In any case, he has not had to use albuterol for rescue purposes.  Currently, he is not using Flovent which was prescribed for daily use as well.  Discussed that he can continue to use albuterol for emergency purposes but if he has daily symptoms, may need to consider using Flovent on a daily basis-he can discuss this further with Dr. Florez.   Other issues:  History of tobaco use-quit 1995  History of crack cocaine ( IV)-remote  History of chronic minocyline use ( acne)  I spent a total of  62 minutes reviewing chart (previous notes), reviewing test results, reviewing chest x rays and CT scans, talking with and examining patient, formulating plan, adjusting medications, and documentation of my findings and plan on the day of the encounter. Time excludes time spent for PFT.   Buster Hurt MD [facility]  Associate Professor of Medicine  Division of Pulmonary, Allergy & Critical Care   [facility]  [facility]      Chief Complaint:   Malachi Domínguez is a 63 year old year old male who is being seen for Consult (New Interstitial Lung )    HPI    Malachi Domínguez is a 63 year old  year old male who presents for evaluation and management of suspected ILD. Briefly, Malachi Domínguez has a history of long-standing asthma for which he follows with Dr Keny Florez at [facility]. He has been maintained on Flovent inhaler for a long time although he admits he has not been using it consistently.  Victorino reports that around 3 months ago he  started noticing more episodes of coughing, intermittent wheezing and worsening shortness of breath.  He also had significant runny nose and chest congestion.  He denied fever, chills over the last few months.  He has never required oxygen.  Per records, for the symptoms, a CT scan of the chest was obtained which was reportedly suspicious for interstitial lung disease, and hence the referral to our clinic today.  Today, Victorino reports that his symptoms have resolved over the last 1 month.  He does not have significant coughing.  He continues to feel congested intermittently.  He quit taking minocycline as was advised by Dr Florez which she had been taking for a long time for acne.  He is not using his albuterol or Flovent anymore.  He feels his breathing is back to baseline.  He denies any limitation with activity as far as his breathing is concerned and continues to remain very active.      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 He used to work as a , did have some exposure to asbestos and quartz ( transient and not long-term).    Other ( welding, hard metal etc, coffee, mushroom,wood): No    Smoking ( tobacco, marijuana, e-cigarettes, vaping, others): IV cocaine ( 30 years ago), edible marijuana now ( off an on, 5 mg thc,5 mg CBD).  No inhalational exposure anymore.   Drugs (Amiodarone, Bleomycin, Nitrofuranoin, Radiation, Immunotherapy): No  Mold/mildew ( flood, musty basement): No  Birds/ bird droppings (pigeons, doves, parakeets, cockaties, chickens, ducks, geese): No  Feather pillow/blanket/down comforter/ jackets: No  Hot tub/jacuzzi/sauna: No  Humidifier: No  Hobbies- woodworking, brass or woodwind instruments, pottery, gardening: No  Chemotherapy: No    ROS: 12 point ROS negative except mentioned in HPI.    Rheumatic ROS: No dry eyes, dry mouth, raynauds, photosensitivity, joint stiffness/pain > 1 hour, blood  "in urine        Past Medical History:   Diagnosis Date    Acne     Benign prostate hyperplasia     Exercise-induced asthma     Functional scoliosis with rib asymmetry     Hepatitis C virus infection, unspecified chronicity     S/p 8 weeks of Mavyret- now viral load undetectable    Insomnia     Low back pain with sciatica     Mild recurrent major depression (H24)     Olecranon bursitis of right elbow     Peripheral polyneuropathy     S/p cervical and lumbar laminectomy    Right shoulder pain               Past Surgical History:   Procedure Laterality Date    ARTHROPLASTY SHOULDER Right 2021    Procedure: RIGHT TOTAL SHOULDER ARTHROPLASTY WITH NO CUSTOM GUIDE;  Surgeon: Primitivo Lozada MD;  Location: [facility] OR    DISCECTOMY CERVICAL MINIMALLY INVASIVE ONE LEVEL Right 2017    L3-L4    ENT SURGERY      Tonsillectomy    LAMINECTOMY      Multiple involves L3-S1    LAMINECTOMY      C6-7 and L4-S1    LAMINECTOMY CERIVCAL POSTERIOR TWO LEVELS          right elbow with ulnar nerve transposition and lateral epicondylitis surgery          Social History     Tobacco Use    Smoking status: Former     Packs/day: 1.00     Years: 10.00     Additional pack years: 0.00     Total pack years: 10.00     Types: Cigarettes     Start date: 1978     Quit date: 1998     Years since quittin.8    Smokeless tobacco: Never   Substance Use Topics    Alcohol use: Not Currently     Comment: Quit 20 years ago    Drug use: Not Currently     Types: \"Crack\" cocaine     Comment: Was a IV drug user- used cocaine 30 years ago                  Family History   Problem Relation Age of Onset    Prostate Cancer Father     Breast Cancer Mother     Skin Cancer Sister           Any family history of ILD:        Current Outpatient Medications   Medication    DULoxetine HCl 40 MG CPEP    fexofenadine (ALLEGRA) 180 MG tablet    fluticasone (FLONASE) 50 MCG/ACT nasal spray    naproxen (NAPROSYN) 500 MG tablet    oxyBUTYnin ER " "(DITROPAN XL) 10 MG 24 hr tablet    Silodosin (RAPAFLO) 8 MG CAPS capsule    TESTOSTERONE ENANTHATE IJ    albuterol (PROAIR HFA/PROVENTIL HFA/VENTOLIN HFA) 108 (90 Base) MCG/ACT inhaler    ASHWAGANDHA PO    aspirin 81 MG EC tablet    Chorionic Gonadotropin (HCG IJ)    fexofenadine-pseudoePHEDrine (ALLEGRA-D 24) 180-240 MG 24 hr tablet    fluticasone (FLOVENT HFA) 44 MCG/ACT inhaler    Melatonin 10 MG TBDP    minocycline (MINOCIN) 100 MG capsule    senna-docusate (SENOKOT-S/PERICOLACE) 8.6-50 MG tablet     No current facility-administered medications for this visit.                        Allergies   Allergen Reactions    Cats     Dogs     Seasonal Allergies     Sulfa Antibiotics Rash                 /85   Pulse 56   Resp 16   Ht 1.778 m (5' 10\")   Wt 64 kg (141 lb 3.2 oz)   SpO2 100%   BMI 20.26 kg/m       Body mass index is 20.26 kg/m .        Physical Examination    General: Well developed, well nourished, No apparent distress  Eyes: Anicteric  Nose: Nasal mucosa with no edema or hyperemia.  No polyps  Ears: Hearing grossly normal  Mouth: Oral mucosa is moist, without any lesions. No oropharyngeal exudate.  Respiratory: Good air movement.  Scattered basilar crackles, clears with coughing,  no rhonchi. No wheezes  Cardiac: RRR, normal S1, S2. No murmurs. No JVD  Abdomen: Soft, NT/ND  Musculoskeletal: Extremities normal. No clubbing. No cyanosis. No edema.  Skin: No rash on limited exam  Neuro: Normal mentation. Normal speech.  Psych:Normal affect         RESULTS:  Serologies:  11/2/23 (outside): Negative rheumatoid factor  PFT interpretation: I personally reviewed and interpreted the PFTs-normal spirometry, normal lung volumes and normal diffusion capacity.   Echocardiogram: Not available.    Chest imaging:    High-resolution CT chest 11/7/2023:    Official read is pending-my interpretation suggest presence of mild subpleural reticulation at the  bilateral lungs, apical basal, scattered micronodules.  " No evidence of traction bronchiectasis or honeycombing.      I personally reviewed and interpreted the chest radiographs.           Again, thank you for allowing me to participate in the care of your patient.        Sincerely,        Buster Hurt MD

## 2023-11-07 NOTE — PROGRESS NOTES
Malachi Domínguez comes into clinic today at the request of Dr. Hurt Ordering Provider for PFT      This service provided today was under the supervising provider of the day Dr. Lin, who was available if needed.    Makenna Franco

## 2023-11-07 NOTE — PROGRESS NOTES
Malachi Domínguez comes into clinic today at the request of Dr. Hurt Ordering Provider for 6 minute walk      This service provided today was under the supervising provider of the day Dr. Lin, who was available if needed.    Makenna Franco

## 2023-11-07 NOTE — PATIENT INSTRUCTIONS
"You have mild \"pulmonary fibrosis\" -it seems to be affecting the rim of your lungs  No need for any medications for the scarring but we need to monitor- comne back in 12 months with breathing test and a walk test. Call us earlier if your breathing changes  Use your emergency inhaler (ALBUTEROlL) as needed and FLOVENT ( idally daily but you can see how you do without using it daily)-please talk to Dr Florez  Blood work today.    Buster Hurt MD [facility]  Associate Professor of Medicine  Division of Pulmonary, Allergy & Critical Care   [facility]  [facility]    "

## 2023-11-08 LAB
ANCA AB PATTERN SER IF-IMP: NORMAL
C-ANCA TITR SER IF: NORMAL {TITER}
CCP AB SER IA-ACNC: 1.3 U/ML
DLCOUNC-%PRED-PRE: 82 %
DLCOUNC-PRE: 22.49 ML/MIN/MMHG
DLCOUNC-PRED: 27.24 ML/MIN/MMHG
ENA SS-A AB SER IA-ACNC: 0.8 U/ML
ENA SS-A AB SER IA-ACNC: NEGATIVE
ENA SS-B IGG SER IA-ACNC: <0.6 U/ML
ENA SS-B IGG SER IA-ACNC: NEGATIVE
ERV-%PRED-PRE: 76 %
ERV-PRE: 1.19 L
ERV-PRED: 1.55 L
EXPTIME-PRE: 6.12 SEC
FEF2575-%PRED-PRE: 166 %
FEF2575-PRE: 4.53 L/SEC
FEF2575-PRED: 2.73 L/SEC
FEFMAX-%PRED-PRE: 96 %
FEFMAX-PRE: 8.76 L/SEC
FEFMAX-PRED: 9.07 L/SEC
FEV1-%PRED-PRE: 114 %
FEV1-PRE: 3.73 L
FEV1FEV6-PRE: 84 %
FEV1FEV6-PRED: 79 %
FEV1FVC-PRE: 84 %
FEV1FVC-PRED: 78 %
FEV1SVC-PRE: 81 %
FEV1SVC-PRED: 74 %
FIFMAX-PRE: 5.76 L/SEC
FRCPLETH-%PRED-PRE: 93 %
FRCPLETH-PRE: 3.39 L
FRCPLETH-PRED: 3.63 L
FVC-%PRED-PRE: 105 %
FVC-PRE: 4.43 L
FVC-PRED: 4.2 L
IC-%PRED-PRE: 110 %
IC-PRE: 3.42 L
IC-PRED: 3.11 L
RVPLETH-%PRED-PRE: 89 %
RVPLETH-PRE: 2.2 L
RVPLETH-PRED: 2.46 L
TLCPLETH-%PRED-PRE: 95 %
TLCPLETH-PRE: 6.81 L
TLCPLETH-PRED: 7.13 L
VA-%PRED-PRE: 93 %
VA-PRE: 6.07 L
VC-%PRED-PRE: 104 %
VC-PRE: 4.61 L
VC-PRED: 4.43 L

## 2023-11-09 LAB
ALDOLASE SERPL-CCNC: 5.1 U/L
ANA PAT SER IF-IMP: ABNORMAL
ANA SER QL IF: POSITIVE
ANA TITR SER IF: ABNORMAL {TITER}

## 2023-11-10 LAB
ENA JO1 AB SER IA-ACNC: <0.5 U/ML
ENA JO1 IGG SER-ACNC: NEGATIVE
ENA SCL70 IGG SER IA-ACNC: <0.6 U/ML
ENA SCL70 IGG SER IA-ACNC: NEGATIVE

## 2023-11-12 ENCOUNTER — HEALTH MAINTENANCE LETTER (OUTPATIENT)
Age: 63
End: 2023-11-12

## 2023-11-13 LAB
A FLAVUS AB SER QL ID: NORMAL
A FUMIGATUS1 AB SER QL ID: NORMAL
A FUMIGATUS2 AB SER QL ID: NORMAL
A FUMIGATUS3 AB SER QL ID: NORMAL
A FUMIGATUS6 AB SER QL ID: NORMAL
A PULLULANS AB SER QL ID: NORMAL
PIGEON SERUM AB QL ID: NORMAL
S RECTIVIRGULA AB SER QL ID: NORMAL
S VIRIDIS AB SER QL ID: NORMAL
T CANDIDUS AB SER QL: NORMAL

## 2023-12-18 ENCOUNTER — MYC MEDICAL ADVICE (OUTPATIENT)
Dept: PULMONOLOGY | Facility: CLINIC | Age: 63
End: 2023-12-18
Payer: COMMERCIAL

## 2023-12-18 DIAGNOSIS — R68.2 DRY MOUTH AND EYES: ICD-10-CM

## 2023-12-18 DIAGNOSIS — H04.123 DRY MOUTH AND EYES: ICD-10-CM

## 2023-12-18 DIAGNOSIS — R76.8 POSITIVE ANA (ANTINUCLEAR ANTIBODY): Primary | ICD-10-CM

## 2023-12-18 DIAGNOSIS — I73.00 RAYNAUD'S PHENOMENON: ICD-10-CM

## 2024-04-03 ENCOUNTER — TELEPHONE (OUTPATIENT)
Dept: PULMONOLOGY | Facility: CLINIC | Age: 64
End: 2024-04-03
Payer: COMMERCIAL

## 2024-04-03 NOTE — TELEPHONE ENCOUNTER
Patient confirmed scheduled appointment:  Date: 06/18/24  Time: 9:30AM  Visit type: CLIVE  Provider: JUAN  Location: [facility]  Testing/imaging: [facility] CT, PFT  Additional notes: N/A

## 2024-06-18 ENCOUNTER — OFFICE VISIT (OUTPATIENT)
Dept: PULMONOLOGY | Facility: CLINIC | Age: 64
End: 2024-06-18
Attending: INTERNAL MEDICINE
Payer: COMMERCIAL

## 2024-06-18 ENCOUNTER — ANCILLARY PROCEDURE (OUTPATIENT)
Dept: CT IMAGING | Facility: CLINIC | Age: 64
End: 2024-06-18
Attending: INTERNAL MEDICINE
Payer: COMMERCIAL

## 2024-06-18 VITALS — OXYGEN SATURATION: 100 % | SYSTOLIC BLOOD PRESSURE: 118 MMHG | HEART RATE: 53 BPM | DIASTOLIC BLOOD PRESSURE: 74 MMHG

## 2024-06-18 DIAGNOSIS — J84.9 ILD (INTERSTITIAL LUNG DISEASE) (H): Primary | ICD-10-CM

## 2024-06-18 DIAGNOSIS — J84.10 PULMONARY FIBROSIS (H): ICD-10-CM

## 2024-06-18 DIAGNOSIS — J84.112 IDIOPATHIC PULMONARY FIBROSIS (H): ICD-10-CM

## 2024-06-18 LAB
DLCOUNC-%PRED-PRE: 77 %
DLCOUNC-PRE: 21.15 ML/MIN/MMHG
DLCOUNC-PRED: 27.13 ML/MIN/MMHG
ERV-%PRED-PRE: 80 %
ERV-PRE: 1.25 L
ERV-PRED: 1.55 L
EXPTIME-PRE: 6.25 SEC
FEF2575-%PRED-PRE: 152 %
FEF2575-PRE: 4.11 L/SEC
FEF2575-PRED: 2.69 L/SEC
FEFMAX-%PRED-PRE: 101 %
FEFMAX-PRE: 9.15 L/SEC
FEFMAX-PRED: 9.02 L/SEC
FEV1-%PRED-PRE: 108 %
FEV1-PRE: 3.54 L
FEV1FEV6-PRE: 84 %
FEV1FEV6-PRED: 79 %
FEV1FVC-PRE: 83 %
FEV1FVC-PRED: 78 %
FEV1SVC-PRE: 80 %
FEV1SVC-PRED: 74 %
FIFMAX-PRE: 6.73 L/SEC
FVC-%PRED-PRE: 101 %
FVC-PRE: 4.25 L
FVC-PRED: 4.18 L
IC-%PRED-PRE: 101 %
IC-PRE: 3.15 L
IC-PRED: 3.09 L
VA-%PRED-PRE: 90 %
VA-PRE: 5.88 L
VC-%PRED-PRE: 99 %
VC-PRE: 4.4 L
VC-PRED: 4.41 L

## 2024-06-18 PROCEDURE — 99215 OFFICE O/P EST HI 40 MIN: CPT | Mod: 25 | Performed by: INTERNAL MEDICINE

## 2024-06-18 PROCEDURE — G0463 HOSPITAL OUTPT CLINIC VISIT: HCPCS | Performed by: INTERNAL MEDICINE

## 2024-06-18 PROCEDURE — 94375 RESPIRATORY FLOW VOLUME LOOP: CPT | Performed by: INTERNAL MEDICINE

## 2024-06-18 PROCEDURE — 94729 DIFFUSING CAPACITY: CPT | Performed by: INTERNAL MEDICINE

## 2024-06-18 PROCEDURE — 71250 CT THORAX DX C-: CPT | Performed by: RADIOLOGY

## 2024-06-18 NOTE — NURSING NOTE
Chief Complaint   Patient presents with    Interstitial Lung Disease (ILD)     F/u     Vitals were taken and medications were reconciled.     ROSLYN Francisco

## 2024-06-18 NOTE — LETTER
6/18/2024      Malachi Domínguez  [address]  [facility] [address]      Dear Colleague,    Thank you for referring your patient, Malachi Domínguez, to the [facility] [place]. Please see a copy of my visit note below.      [facility] Interstitial Lung Disease Program          Date of Visit: June 18, 2024   Clinic Location: [facility]      ASSESSMENT:  ILD: Victorino has evidence of mild pulmonary fibrosis based on review of high-resolution CT scan imaging.  He has no pulmonary function limitation, is asymptomatic, and has not experienced any desaturation on the 6-minute walk test.   Imaging characteristics indeterminate for UIP, but could be evolving. High-resolution CT chest 6/18/2024 demonstrates stability of pulmonary fibrosis.  He also has scattered unchanged benign pulmonary nodules.  Prominent aortopulmonary window lymph nodes that appears benign in nature as well.  No characteristic features of pneumoconiosis although does have some exposure history.  Minocycline in very rare situations can cause interstitial lung disease, although this tends to be an acute form of interstitial pneumonia.  It is unlikely that his interstitial lung disease is secondary to Minocycline, but he is already off this medication and I recommended him to abstain from using it if he is okay with it.  MARTI 1:160 positive, nucleolar pattern 11/2023.  Hypersensitivity panel negative. Rheumatology evaluation yielded mildly positive antihistone antibodies ( 2/2024-Radha).   Disease monitoring: Victorino has mild pulmonary fibrosis with no limitation of pulmonary function and no desaturations noted on the 6-minute walk.    Pulmonary hypertension: Currently low suspicion.  No indication for echocardiogram.  Immunomodulatory therapy and monitoring: Not indicated  Antifibrotic therapy: With mild pulmonary fibrosis and normal pulmonary function, will hold off on  initiating antifibrotic therapy although we discussed briefly the 2 agents and there benefits versus adverse effects profile.   Oxygen supplementation: Currently not indicated  Pulmonary rehabilitation: Currently not indicated  Rheumatology evaluation-[facility]-and that some suspicion for undifferentiated connective tissue disorder.  All antibodies have been negative except antihistone antibodies that were mildly positive.  Esophageal evaluation demonstrated mild esophageal dysmotility but he is off PPI due to intolerance and is currently asymptomatic.  At this point, no overt evidence of well-differentiated connective tissue disorder.   GERD therapy: No indication for PPI or H2 blocker.  Previously tried omeprazole but stopped due to side effects.  History of asthma: Seems to be well controlled.  He may have mild emphysema based on my interpretation of his CT scan imaging today.  In any case, he has not had to use albuterol for rescue purposes.  Currently, he is on combination therapy with fluticasone and salmeterol prescribed his primary care physician and reports modest benefit.  He can continue the same for maintenance.  Use albuterol for rescue purposes.    Other issues:  History of tobaco use-quit 1995  History of crack cocaine ( IV)-remote  History of chronic minocyline use ( acne)  RTC: 6 months with PFT, 6MWT.   I spent a total of 45  minutes reviewing chart (previous notes), reviewing test results, reviewing chest x rays and CT scans, talking with and examining patient, formulating plan, adjusting medications, and documentation of my findings and plan on the day of the encounter. Time excludes time spent for PFT.   Buster Hurt MD [facility]  Associate Professor of Medicine  Division of Pulmonary, Allergy & Critical Care   [facility]  [facility]      Chief Complaint:   Malachi Domínguez is a 63 year old year old male who is being seen for Interstitial  Lung Disease (ILD) (F/u)    HPI:    June 18, 2024: Victorino presents for follow-up.  Last seen 11/2023.  Since last visit, he denies any change in his breathing.  He continues to be active.  He is retired but keeps himself busy.  He is not wearing oxygen at home.  Since last visit, he was evaluated by rheumatology at [facility].  Comprehensive evaluation was performed and yielded mildly positive antihistone antibodies and mild esophageal dysmotility for which he was placed on omeprazole briefly but stopped due to adverse effects.  Today he feels his breathing has been stable.  He denies fever, chills, night sweats, significant changes in weight or appetite.    Initial HPI:    Mlaachi Domínguez is a 63 year old  year old male who presents for evaluation and management of suspected ILD. Briefly, Malachi Domínguez has a history of long-standing asthma for which he follows with Dr Keny Florez at [place]. He has been maintained on Flovent inhaler for a long time although he admits he has not been using it consistently.  Victorino reports that around 3 months ago he started noticing more episodes of coughing, intermittent wheezing and worsening shortness of breath.  He also had significant runny nose and chest congestion.  He denied fever, chills over the last few months.  He has never required oxygen.  Per records, for the symptoms, a CT scan of the chest was obtained which was reportedly suspicious for interstitial lung disease, and hence the referral to our clinic today.  Today, Victorino reports that his symptoms have resolved over the last 1 month.  He does not have significant coughing.  He continues to feel congested intermittently.  He quit taking minocycline as was advised by Dr Florez which she had been taking for a long time for acne.  He is not using his albuterol or Flovent anymore.  He feels his breathing is back to baseline.  He denies any limitation with activity as far as his breathing is concerned and  continues to remain very active.      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 He used to work as a , did have some exposure to asbestos and quartz ( transient and not long-term).    Other ( welding, hard metal etc, coffee, mushroom,wood): No    Smoking ( tobacco, marijuana, e-cigarettes, vaping, others): IV cocaine ( 30 years ago), edible marijuana now ( off an on, 5 mg thc,5 mg CBD).  No inhalational exposure anymore.   Drugs (Amiodarone, Bleomycin, Nitrofuranoin, Radiation, Immunotherapy): No  Mold/mildew ( flood, musty basement): No  Birds/ bird droppings (pigeons, doves, parakeets, cockaties, chickens, ducks, geese): No  Feather pillow/blanket/down comforter/ jackets: No  Hot tub/jacuzzi/sauna: No  Humidifier: No  Hobbies- woodworking, brass or woodwind instruments, pottery, gardening: No  Chemotherapy: No    ROS: 12 point ROS negative except mentioned in HPI.    Rheumatic ROS: No dry eyes, dry mouth, raynauds, photosensitivity, joint stiffness/pain > 1 hour, blood in urine        Past Medical History:   Diagnosis Date     Acne      Benign prostate hyperplasia      Exercise-induced asthma      Functional scoliosis with rib asymmetry      Hepatitis C virus infection, unspecified chronicity     S/p 8 weeks of Mavyret- now viral load undetectable     Insomnia      Low back pain with sciatica      Mild recurrent major depression (H24)      Olecranon bursitis of right elbow      Peripheral polyneuropathy     S/p cervical and lumbar laminectomy     Right shoulder pain               Past Surgical History:   Procedure Laterality Date     ARTHROPLASTY SHOULDER Right 12/30/2021    Procedure: RIGHT TOTAL SHOULDER ARTHROPLASTY WITH NO CUSTOM GUIDE;  Surgeon: Primitivo Lozada MD;  Location: [facility] OR     DISCECTOMY CERVICAL MINIMALLY INVASIVE ONE LEVEL Right 01/17/2017    L3-L4     ENT SURGERY      Tonsillectomy      "LAMINECTOMY  2000    Multiple involves L3-S1     LAMINECTOMY      C6-7 and L4-S1     LAMINECTOMY CERIVCAL POSTERIOR TWO LEVELS           right elbow with ulnar nerve transposition and lateral epicondylitis surgery          Social History     Tobacco Use     Smoking status: Former     Current packs/day: 0.00     Average packs/day: 1 pack/day for 20.0 years (20.0 ttl pk-yrs)     Types: Cigarettes     Start date: 1978     Quit date: 1998     Years since quittin.4     Smokeless tobacco: Never   Substance Use Topics     Alcohol use: Not Currently     Comment: Quit 20 years ago     Drug use: Not Currently     Types: \"Crack\" cocaine     Comment: Was a IV drug user- used cocaine 30 years ago                  Family History   Problem Relation Age of Onset     Prostate Cancer Father      Breast Cancer Mother      Skin Cancer Sister           Any family history of ILD:  NA       Current Outpatient Medications   Medication Sig Dispense Refill     albuterol (PROAIR HFA/PROVENTIL HFA/VENTOLIN HFA) 108 (90 Base) MCG/ACT inhaler Inhale 2 puffs into the lungs every 6 hours (Patient not taking: Reported on 2023)       ASHWAGANDHA PO Take 1 g by mouth daily (Patient not taking: Reported on 2023)       aspirin 81 MG EC tablet Take 1 tablet (81 mg) by mouth 2 times daily (Patient not taking: Reported on 2023) 60 tablet 0     Chorionic Gonadotropin (HCG IJ) Inject as directed twice a week  (Patient not taking: Reported on 2023)       DULoxetine HCl 40 MG CPEP Take 1 capsule by mouth daily        fexofenadine (ALLEGRA) 180 MG tablet Take 180 mg by mouth daily       fexofenadine-pseudoePHEDrine (ALLEGRA-D 24) 180-240 MG 24 hr tablet Take 1 tablet by mouth daily (Patient not taking: Reported on 2023)       fluticasone (FLONASE) 50 MCG/ACT nasal spray Spray 1 spray into both nostrils daily       fluticasone (FLOVENT HFA) 44 MCG/ACT inhaler Inhale 1 puff into the lungs 2 times daily  (Patient not " taking: Reported on 11/7/2023)       Melatonin 10 MG TBDP  (Patient not taking: Reported on 11/7/2023)       minocycline (MINOCIN) 100 MG capsule Take 100 mg by mouth 2 times daily (Patient not taking: Reported on 11/7/2023)       naproxen (NAPROSYN) 500 MG tablet Take 500 mg by mouth as needed for moderate pain       oxyBUTYnin ER (DITROPAN XL) 10 MG 24 hr tablet        senna-docusate (SENOKOT-S/PERICOLACE) 8.6-50 MG tablet Take 1-2 tablets by mouth 2 times daily Take while on oral narcotics to prevent or treat constipation. (Patient not taking: Reported on 11/7/2023) 30 tablet 0     Silodosin (RAPAFLO) 8 MG CAPS capsule Take 8 mg by mouth daily        TESTOSTERONE ENANTHATE IJ Inject as directed twice a week        No current facility-administered medications for this visit.                        Allergies   Allergen Reactions     Cats      Dogs      Seasonal Allergies      Sulfa Antibiotics Rash                 /74   Pulse 53   SpO2 100%      There is no height or weight on file to calculate BMI.        Physical Examination    General: Well developed, well nourished, No apparent distress  Eyes: Anicteric  Nose: Nasal mucosa with no edema or hyperemia.  No polyps  Ears: Hearing grossly normal  Mouth: Oral mucosa is moist, without any lesions. No oropharyngeal exudate.  Respiratory: Good air movement.  Scattered basilar crackles, clears with coughing,  no rhonchi. No wheezes  Cardiac: RRR, normal S1, S2. No murmurs. No JVD  Abdomen: Soft, NT/ND  Musculoskeletal: Mild arthritis +  Skin: No rash on limited exam  Neuro: Normal mentation. Normal speech.  Psych:Normal affect         RESULTS:  Serologies:  11/2/23 (outside): Negative rheumatoid factor   Latest Reference Range & Units 11/07/23 09:34   Aldolase 1.2 - 7.6 U/L 5.1   CK Total 39 - 308 U/L 85   CRP Inflammation <5.00 mg/L <3.00   Cyclic Citrullinated Peptide Antibody, IgG <7.0 U/mL 1.3   Rheumatoid Factor <12 IU/mL <6   Aspergillus Fumagatis 1  Antibody None Detected  None Detected   Aspergillus Fumagatis 6 Antibody None Detected  None Detected   Aureo Pullulans None Detected  None Detected   [place] serum None Detected  None Detected   Micropolyspora Faeni None Detected  None Detected   Sed Rate 0 - 20 mm/hr 10   ANTI NUCLEAR SONAL IGG BY IFA WITH REFLEX  Rpt !   !: Data is abnormal  MARTI 11/2023:        PFT interpretation: I personally reviewed and interpreted the PFTs-normal spirometry, normal lung volumes and normal diffusion capacity.       Echocardiogram: Not available.    Chest imaging:    Recent Results (from the past 24 hour(s))   CT Chest Hi-Resolution wo Contrast    Narrative    CT chest high resolution without contrast    INDICATION: Pulmonary fibrosis.    COMPARISON: High-resolution CT 11/7/2023    FINDINGS: No contrast. Inspiration and expiration supine imaging  obtained. The included thyroid appears unremarkable. Right shoulder  prosthesis again noted. Prominent aortopulmonary window lymph node  appears similar measuring 2.2 x 0.6 cm, previously 2.0 x 0.8 cm. No  new prominent lymph nodes. Heart size normal. No pleural or  pericardial effusion. Thoracic aortic caliber normal. Pulmonary artery  caliber normal. No suspicious upper abdominal finding.  Bone detail shows mild degenerative spurring in the thoracolumbar  junction region of the spine. No suspicious sclerotic or  lytic/destructive lesion.    Detail of the lungs shows scattered 2 mm right upper lobe pulmonary  nodules also with granulomas in the left upper lobe. No dominant  pulmonary nodules or consolidations. There is mild subpleural  reticular opacification with very slight lower lobe predominance over  upper lobe prominence. This appearance is unchanged.  Expiratory imaging does show some air trapping.      Impression    IMPRESSION: Findings again indeterminate for UIP-type pneumonia.  Cannot entirely exclude respiratory bronchiolitis-interstitial lung  disease or fibrotic nonspecific  interstitial pneumonia or fibrotic  hypersensitivity pneumonitis (if applicable exposure history is  present). Scattered unchanged almost certainly benign pulmonary  nodules. 12 month follow-up CT again recommended to ensure stability.  Prominent aortopulmonary window lymph node just under a centimeter in  short axis over 2 cm in long axis grossly unchanged.    SYLVIA FRYE MD         SYSTEM ID:  [id number]        I personally reviewed and interpreted the chest radiographs.     The longitudinal plan of care for post lung transplant and complications of post-transplant was addressed during this visit. Due to the added complexity in care, I will continue to support this patient in the subsequent management of this condition(s) and with the ongoing continuity of care of this condition       Again, thank you for allowing me to participate in the care of your patient.        Sincerely,        Buster Hurt MD

## 2024-06-18 NOTE — PATIENT INSTRUCTIONS
You have mild pulmonary fibrosis. No signs of worsening.  See you in 6 months wit breathing test and walking test.    Buster Hurt MD FCCP  Associate Professor of Medicine  Division of Pulmonary, Allergy & Critical Care   [facility]  [facility]

## 2024-06-18 NOTE — PROGRESS NOTES
[facility] Interstitial Lung Disease Program          Date of Visit: June 18, 2024   Clinic Location: [facility]      ASSESSMENT:  ILD: Victorino has evidence of mild pulmonary fibrosis based on review of high-resolution CT scan imaging.  He has no pulmonary function limitation, is asymptomatic, and has not experienced any desaturation on the 6-minute walk test.   Imaging characteristics indeterminate for UIP, but could be evolving. High-resolution CT chest 6/18/2024 demonstrates stability of pulmonary fibrosis.  He also has scattered unchanged benign pulmonary nodules.  Prominent aortopulmonary window lymph nodes that appears benign in nature as well.  No characteristic features of pneumoconiosis although does have some exposure history.  Minocycline in very rare situations can cause interstitial lung disease, although this tends to be an acute form of interstitial pneumonia.  It is unlikely that his interstitial lung disease is secondary to Minocycline, but he is already off this medication and I recommended him to abstain from using it if he is okay with it.  MARTI 1:160 positive, nucleolar pattern 11/2023.  Hypersensitivity panel negative. Rheumatology evaluation yielded mildly positive antihistone antibodies ( 2/2024-Radha).   Disease monitoring: Victorino has mild pulmonary fibrosis with no limitation of pulmonary function and no desaturations noted on the 6-minute walk.    Pulmonary hypertension: Currently low suspicion.  No indication for echocardiogram.  Immunomodulatory therapy and monitoring: Not indicated  Antifibrotic therapy: With mild pulmonary fibrosis and normal pulmonary function, will hold off on initiating antifibrotic therapy although we discussed briefly the 2 agents and there benefits versus adverse effects profile.   Oxygen supplementation: Currently not indicated  Pulmonary rehabilitation: Currently not indicated  Rheumatology evaluation-[facility]-and that some suspicion for  undifferentiated connective tissue disorder.  All antibodies have been negative except antihistone antibodies that were mildly positive.  Esophageal evaluation demonstrated mild esophageal dysmotility but he is off PPI due to intolerance and is currently asymptomatic.  At this point, no overt evidence of well-differentiated connective tissue disorder.   GERD therapy: No indication for PPI or H2 blocker.  Previously tried omeprazole but stopped due to side effects.  History of asthma: Seems to be well controlled.  He may have mild emphysema based on my interpretation of his CT scan imaging today.  In any case, he has not had to use albuterol for rescue purposes.  Currently, he is on combination therapy with fluticasone and salmeterol prescribed his primary care physician and reports modest benefit.  He can continue the same for maintenance.  Use albuterol for rescue purposes.    Other issues:  History of tobaco use-quit 1995  History of crack cocaine ( IV)-remote  History of chronic minocyline use ( acne)  RTC: 6 months with PFT, 6MWT.   I spent a total of 45  minutes reviewing chart (previous notes), reviewing test results, reviewing chest x rays and CT scans, talking with and examining patient, formulating plan, adjusting medications, and documentation of my findings and plan on the day of the encounter. Time excludes time spent for PFT.   Buster Hurt MD [facility]  Associate Professor of Medicine  Division of Pulmonary, Allergy & Critical Care   [facility]  [facility]      Chief Complaint:   Malachi Domínguez is a 63 year old year old male who is being seen for Interstitial Lung Disease (ILD) (F/u)    HPI:    June 18, 2024: Victorino presents for follow-up.  Last seen 11/2023.  Since last visit, he denies any change in his breathing.  He continues to be active.  He is retired but keeps himself busy.  He is not wearing oxygen at home.  Since last visit, he was evaluated by  rheumatology at [facility].  Comprehensive evaluation was performed and yielded mildly positive antihistone antibodies and mild esophageal dysmotility for which he was placed on omeprazole briefly but stopped due to adverse effects.  Today he feels his breathing has been stable.  He denies fever, chills, night sweats, significant changes in weight or appetite.    Initial HPI:    Malachi Domínguez is a 63 year old  year old male who presents for evaluation and management of suspected ILD. Briefly, Malachi Domínguez has a history of long-standing asthma for which he follows with Dr Keny Florez at [place]. He has been maintained on Flovent inhaler for a long time although he admits he has not been using it consistently.  Victorino reports that around 3 months ago he started noticing more episodes of coughing, intermittent wheezing and worsening shortness of breath.  He also had significant runny nose and chest congestion.  He denied fever, chills over the last few months.  He has never required oxygen.  Per records, for the symptoms, a CT scan of the chest was obtained which was reportedly suspicious for interstitial lung disease, and hence the referral to our clinic today.  Today, Victorino reports that his symptoms have resolved over the last 1 month.  He does not have significant coughing.  He continues to feel congested intermittently.  He quit taking minocycline as was advised by Dr Florez which she had been taking for a long time for acne.  He is not using his albuterol or Flovent anymore.  He feels his breathing is back to baseline.  He denies any limitation with activity as far as his breathing is concerned and continues to remain very active.      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 He used to work as a , did have some exposure to asbestos and quartz ( transient and not long-term).    Other ( welding, hard  metal etc, coffee, mushroom,wood): No    Smoking ( tobacco, marijuana, e-cigarettes, vaping, others): IV cocaine ( 30 years ago), edible marijuana now ( off an on, 5 mg thc,5 mg CBD).  No inhalational exposure anymore.   Drugs (Amiodarone, Bleomycin, Nitrofuranoin, Radiation, Immunotherapy): No  Mold/mildew ( flood, musty basement): No  Birds/ bird droppings (pigeons, doves, parakeets, cockaties, chickens, ducks, geese): No  Feather pillow/blanket/down comforter/ jackets: No  Hot tub/jacuzzi/sauna: No  Humidifier: No  Hobbies- woodworking, brass or woodwind instruments, pottery, gardening: No  Chemotherapy: No    ROS: 12 point ROS negative except mentioned in HPI.    Rheumatic ROS: No dry eyes, dry mouth, raynauds, photosensitivity, joint stiffness/pain > 1 hour, blood in urine        Past Medical History:   Diagnosis Date    Acne     Benign prostate hyperplasia     Exercise-induced asthma     Functional scoliosis with rib asymmetry     Hepatitis C virus infection, unspecified chronicity     S/p 8 weeks of Mavyret- now viral load undetectable    Insomnia     Low back pain with sciatica     Mild recurrent major depression (H24)     Olecranon bursitis of right elbow     Peripheral polyneuropathy     S/p cervical and lumbar laminectomy    Right shoulder pain               Past Surgical History:   Procedure Laterality Date    ARTHROPLASTY SHOULDER Right 12/30/2021    Procedure: RIGHT TOTAL SHOULDER ARTHROPLASTY WITH NO CUSTOM GUIDE;  Surgeon: Primitivo Lozada MD;  Location: [facility] OR    DISCECTOMY CERVICAL MINIMALLY INVASIVE ONE LEVEL Right 01/17/2017    L3-L4    ENT SURGERY      Tonsillectomy    LAMINECTOMY  2000    Multiple involves L3-S1    LAMINECTOMY      C6-7 and L4-S1    LAMINECTOMY CERIVCAL POSTERIOR TWO LEVELS      1990    right elbow with ulnar nerve transposition and lateral epicondylitis surgery          Social History     Tobacco Use    Smoking status: Former     Current packs/day: 0.00     Average  "packs/day: 1 pack/day for 20.0 years (20.0 ttl pk-yrs)     Types: Cigarettes     Start date: 1978     Quit date: 1998     Years since quittin.4    Smokeless tobacco: Never   Substance Use Topics    Alcohol use: Not Currently     Comment: Quit 20 years ago    Drug use: Not Currently     Types: \"Crack\" cocaine     Comment: Was a IV drug user- used cocaine 30 years ago                  Family History   Problem Relation Age of Onset    Prostate Cancer Father     Breast Cancer Mother     Skin Cancer Sister           Any family history of ILD:  NA       Current Outpatient Medications   Medication Sig Dispense Refill    albuterol (PROAIR HFA/PROVENTIL HFA/VENTOLIN HFA) 108 (90 Base) MCG/ACT inhaler Inhale 2 puffs into the lungs every 6 hours (Patient not taking: Reported on 2023)      ASHWAGANDHA PO Take 1 g by mouth daily (Patient not taking: Reported on 2023)      aspirin 81 MG EC tablet Take 1 tablet (81 mg) by mouth 2 times daily (Patient not taking: Reported on 2023) 60 tablet 0    Chorionic Gonadotropin (HCG IJ) Inject as directed twice a week  (Patient not taking: Reported on 2023)      DULoxetine HCl 40 MG CPEP Take 1 capsule by mouth daily       fexofenadine (ALLEGRA) 180 MG tablet Take 180 mg by mouth daily      fexofenadine-pseudoePHEDrine (ALLEGRA-D 24) 180-240 MG 24 hr tablet Take 1 tablet by mouth daily (Patient not taking: Reported on 2023)      fluticasone (FLONASE) 50 MCG/ACT nasal spray Spray 1 spray into both nostrils daily      fluticasone (FLOVENT HFA) 44 MCG/ACT inhaler Inhale 1 puff into the lungs 2 times daily  (Patient not taking: Reported on 2023)      Melatonin 10 MG TBDP  (Patient not taking: Reported on 2023)      minocycline (MINOCIN) 100 MG capsule Take 100 mg by mouth 2 times daily (Patient not taking: Reported on 2023)      naproxen (NAPROSYN) 500 MG tablet Take 500 mg by mouth as needed for moderate pain      oxyBUTYnin ER (DITROPAN XL) " 10 MG 24 hr tablet       senna-docusate (SENOKOT-S/PERICOLACE) 8.6-50 MG tablet Take 1-2 tablets by mouth 2 times daily Take while on oral narcotics to prevent or treat constipation. (Patient not taking: Reported on 11/7/2023) 30 tablet 0    Silodosin (RAPAFLO) 8 MG CAPS capsule Take 8 mg by mouth daily       TESTOSTERONE ENANTHATE IJ Inject as directed twice a week        No current facility-administered medications for this visit.                        Allergies   Allergen Reactions    Cats     Dogs     Seasonal Allergies     Sulfa Antibiotics Rash                 /74   Pulse 53   SpO2 100%      There is no height or weight on file to calculate BMI.        Physical Examination    General: Well developed, well nourished, No apparent distress  Eyes: Anicteric  Nose: Nasal mucosa with no edema or hyperemia.  No polyps  Ears: Hearing grossly normal  Mouth: Oral mucosa is moist, without any lesions. No oropharyngeal exudate.  Respiratory: Good air movement.  Scattered basilar crackles, clears with coughing,  no rhonchi. No wheezes  Cardiac: RRR, normal S1, S2. No murmurs. No JVD  Abdomen: Soft, NT/ND  Musculoskeletal: Mild arthritis +  Skin: No rash on limited exam  Neuro: Normal mentation. Normal speech.  Psych:Normal affect         RESULTS:  Serologies:  11/2/23 (outside): Negative rheumatoid factor   Latest Reference Range & Units 11/07/23 09:34   Aldolase 1.2 - 7.6 U/L 5.1   CK Total 39 - 308 U/L 85   CRP Inflammation <5.00 mg/L <3.00   Cyclic Citrullinated Peptide Antibody, IgG <7.0 U/mL 1.3   Rheumatoid Factor <12 IU/mL <6   Aspergillus Fumagatis 1 Antibody None Detected  None Detected   Aspergillus Fumagatis 6 Antibody None Detected  None Detected   Aureo Pullulans None Detected  None Detected   Webster serum None Detected  None Detected   Micropolyspora Faeni None Detected  None Detected   Sed Rate 0 - 20 mm/hr 10   ANTI NUCLEAR SONAL IGG BY IFA WITH REFLEX  Rpt !   !: Data is abnormal  MARTI  11/2023:        PFT interpretation: I personally reviewed and interpreted the PFTs-normal spirometry, normal lung volumes and normal diffusion capacity.       Echocardiogram: Not available.    Chest imaging:    Recent Results (from the past 24 hour(s))   CT Chest Hi-Resolution wo Contrast    Narrative    CT chest high resolution without contrast    INDICATION: Pulmonary fibrosis.    COMPARISON: High-resolution CT 11/7/2023    FINDINGS: No contrast. Inspiration and expiration supine imaging  obtained. The included thyroid appears unremarkable. Right shoulder  prosthesis again noted. Prominent aortopulmonary window lymph node  appears similar measuring 2.2 x 0.6 cm, previously 2.0 x 0.8 cm. No  new prominent lymph nodes. Heart size normal. No pleural or  pericardial effusion. Thoracic aortic caliber normal. Pulmonary artery  caliber normal. No suspicious upper abdominal finding.  Bone detail shows mild degenerative spurring in the thoracolumbar  junction region of the spine. No suspicious sclerotic or  lytic/destructive lesion.    Detail of the lungs shows scattered 2 mm right upper lobe pulmonary  nodules also with granulomas in the left upper lobe. No dominant  pulmonary nodules or consolidations. There is mild subpleural  reticular opacification with very slight lower lobe predominance over  upper lobe prominence. This appearance is unchanged.  Expiratory imaging does show some air trapping.      Impression    IMPRESSION: Findings again indeterminate for UIP-type pneumonia.  Cannot entirely exclude respiratory bronchiolitis-interstitial lung  disease or fibrotic nonspecific interstitial pneumonia or fibrotic  hypersensitivity pneumonitis (if applicable exposure history is  present). Scattered unchanged almost certainly benign pulmonary  nodules. 12 month follow-up CT again recommended to ensure stability.  Prominent aortopulmonary window lymph node just under a centimeter in  short axis over 2 cm in long axis  grossly unchanged.    SYLVIA FRYE MD         SYSTEM ID:  [id number]        I personally reviewed and interpreted the chest radiographs.     The longitudinal plan of care for post lung transplant and complications of post-transplant was addressed during this visit. Due to the added complexity in care, I will continue to support this patient in the subsequent management of this condition(s) and with the ongoing continuity of care of this condition

## 2024-12-28 ENCOUNTER — HEALTH MAINTENANCE LETTER (OUTPATIENT)
Age: 64
End: 2024-12-28

## 2025-02-18 ENCOUNTER — OFFICE VISIT (OUTPATIENT)
Dept: PULMONOLOGY | Facility: CLINIC | Age: 65
End: 2025-02-18
Attending: INTERNAL MEDICINE
Payer: COMMERCIAL

## 2025-02-18 VITALS
HEART RATE: 59 BPM | DIASTOLIC BLOOD PRESSURE: 79 MMHG | SYSTOLIC BLOOD PRESSURE: 145 MMHG | OXYGEN SATURATION: 100 % | BODY MASS INDEX: 22.22 KG/M2 | WEIGHT: 155.2 LBS | HEIGHT: 70 IN

## 2025-02-18 DIAGNOSIS — J84.9 ILD (INTERSTITIAL LUNG DISEASE) (H): ICD-10-CM

## 2025-02-18 DIAGNOSIS — K21.9 GASTROESOPHAGEAL REFLUX DISEASE WITHOUT ESOPHAGITIS: ICD-10-CM

## 2025-02-18 DIAGNOSIS — K22.4 ESOPHAGEAL DYSFUNCTION: Primary | ICD-10-CM

## 2025-02-18 DIAGNOSIS — J84.112 IDIOPATHIC PULMONARY FIBROSIS (H): ICD-10-CM

## 2025-02-18 DIAGNOSIS — R76.8 POSITIVE ANA (ANTINUCLEAR ANTIBODY): ICD-10-CM

## 2025-02-18 DIAGNOSIS — J84.10 PULMONARY FIBROSIS (H): ICD-10-CM

## 2025-02-18 LAB
6 MIN WALK (FT): 1650 FT
6 MIN WALK (M): 503 M

## 2025-02-18 PROCEDURE — 94726 PLETHYSMOGRAPHY LUNG VOLUMES: CPT | Performed by: INTERNAL MEDICINE

## 2025-02-18 PROCEDURE — 99215 OFFICE O/P EST HI 40 MIN: CPT | Mod: 25 | Performed by: INTERNAL MEDICINE

## 2025-02-18 PROCEDURE — 94729 DIFFUSING CAPACITY: CPT | Performed by: INTERNAL MEDICINE

## 2025-02-18 PROCEDURE — 99207 PR NO CHARGE LOS: CPT

## 2025-02-18 PROCEDURE — G0463 HOSPITAL OUTPT CLINIC VISIT: HCPCS | Performed by: INTERNAL MEDICINE

## 2025-02-18 PROCEDURE — 94150 VITAL CAPACITY TEST: CPT | Performed by: INTERNAL MEDICINE

## 2025-02-18 PROCEDURE — 94375 RESPIRATORY FLOW VOLUME LOOP: CPT | Performed by: INTERNAL MEDICINE

## 2025-02-18 RX ORDER — FLUTICASONE PROPIONATE AND SALMETEROL 100; 50 UG/1; UG/1
POWDER RESPIRATORY (INHALATION)
COMMUNITY
Start: 2024-04-01

## 2025-02-18 RX ORDER — VIBEGRON 75 MG/1
1 TABLET, FILM COATED ORAL DAILY
COMMUNITY
Start: 2023-09-26

## 2025-02-18 ASSESSMENT — PAIN SCALES - GENERAL: PAINLEVEL_OUTOF10: NO PAIN (0)

## 2025-02-18 NOTE — PROGRESS NOTES
[facility] Interstitial Lung Disease Program          Date of Visit: February 18, 2025   Clinic Location:     [facility] FOR LUNG SCIENCE AND [address] [address]  Phone: [phone]  Fax: [phone]        ASSESSMENT:  ILD: Victorino has evidence of mild pulmonary fibrosis based on review of high-resolution CT scan imaging.  He has no pulmonary function limitation, is asymptomatic, and has not experienced any desaturation on the 6-minute walk test.   Imaging characteristics indeterminate for UIP, but could be evolving. High-resolution CT chest 6/18/2024 demonstrates stability of pulmonary fibrosis.  He also has scattered unchanged benign pulmonary nodules.  Prominent aortopulmonary window lymph nodes that appears benign in nature as well.  No characteristic features of pneumoconiosis although does have some exposure history.  Minocycline in very rare situations can cause interstitial lung disease, although this tends to be an acute form of interstitial pneumonia.  It is unlikely that his interstitial lung disease is secondary to Minocycline, but he is already off this medication and I recommended him to abstain from using it if he is okay with it.  MARTI 1:160 positive, nucleolar pattern 11/2023.  Hypersensitivity panel negative. Rheumatology evaluation yielded mildly positive antihistone antibodies ( 2/2024-Radha).   Disease monitoring: Victorino has mild pulmonary fibrosis.  However, his pulmonary function has declined on spirometry today although things are unchanged as far as symptoms.  We will repeat PFT, 6-minute walk test and a high-res CT scan in 4 months  Pulmonary hypertension: Currently low suspicion.  No indication for echocardiogram.  Immunomodulatory therapy and monitoring: Not indicated  Antifibrotic therapy: With mild pulmonary fibrosis and normal pulmonary function, will hold off on initiating antifibrotic therapy although we  discussed briefly the 2 agents and there benefits versus adverse effects profile.   Oxygen supplementation: Currently not indicated.  Pulmonary rehabilitation: Currently not indicated.  Rheumatology evaluation-[facility]-and that some suspicion for undifferentiated connective tissue disorder.  All antibodies have been negative except antihistone antibodies that were mildly positive.    Esophageal evaluation demonstrated mild esophageal dysmotility but he is off PPI due to intolerance and is currently asymptomatic.  At this point, no overt evidence of well-differentiated connective tissue disorder.  Given progression lung disease and intolerance to PPI, I have ordered a referral to [facility] for assessment of GERD and dysmotility.  History of asthma: Seems to be well controlled.  He may have mild emphysema based on my interpretation of his CT scan imaging today.  In any case, he has not had to use albuterol for rescue purposes.  Currently, he is on combination therapy with fluticasone and salmeterol prescribed his primary care physician and reports modest benefit.  He can continue the same for maintenance.  Use albuterol for rescue purposes.    Other issues:  History of tobaco use-quit 1995  History of crack cocaine ( IV)-remote  History of chronic minocyline use ( acne)  RTC: 6 months with PFT, 6MWT.   I spent a total of 45  minutes reviewing chart (previous notes), reviewing test results, reviewing chest x rays and CT scans, talking with and examining patient, formulating plan, adjusting medications, and documentation of my findings and plan on the day of the encounter. Time excludes time spent for PFT.   Buster Hurt MD [facility]  Associate Professor of Medicine  Division of Pulmonary, Allergy & Critical Care   [facility]  [facility]      Chief Complaint:   Malachi Domínguez is a 63 year old year old male who is being seen for Follow Up (Return ILD  )    HPI:    February 18, 2025 : Malachi Domínguez returns for follow-up.  Last seen 6/2024.  Since then, he reports mostly feeling about the same.  He has occasional cough that resolved spontaneously.  He is using albuterol and inhalers as previously prescribed.  He has not required any hospitalization or courses of steroids.  No fever, chills, night sweats, significant weight changes reported today.    Initial HPI:    Malachi Domínguez is a 63 year old  year old male who presents for evaluation and management of suspected ILD. Briefly, Malachi Domínguez has a history of long-standing asthma for which he follows with Dr Keny Florez at [place]. He has been maintained on Flovent inhaler for a long time although he admits he has not been using it consistently.  Victorino reports that around 3 months ago he started noticing more episodes of coughing, intermittent wheezing and worsening shortness of breath.  He also had significant runny nose and chest congestion.  He denied fever, chills over the last few months.  He has never required oxygen.  Per records, for the symptoms, a CT scan of the chest was obtained which was reportedly suspicious for interstitial lung disease, and hence the referral to our clinic today.  Today, Victorino reports that his symptoms have resolved over the last 1 month.  He does not have significant coughing.  He continues to feel congested intermittently.  He quit taking minocycline as was advised by Dr Florez which she had been taking for a long time for acne.  He is not using his albuterol or Flovent anymore.  He feels his breathing is back to baseline.  He denies any limitation with activity as far as his breathing is concerned and continues to remain very active.      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 He used to work as a , did have some exposure to asbestos and quartz ( transient and  not long-term).    Other ( welding, hard metal etc, coffee, mushroom,wood): No    Smoking ( tobacco, marijuana, e-cigarettes, vaping, others): IV cocaine ( 30 years ago), edible marijuana now ( off an on, 5 mg thc,5 mg CBD).  No inhalational exposure anymore.   Drugs (Amiodarone, Bleomycin, Nitrofuranoin, Radiation, Immunotherapy): No  Mold/mildew ( flood, musty basement): No  Birds/ bird droppings (pigeons, doves, parakeets, cockaties, chickens, ducks, geese): No  Feather pillow/blanket/down comforter/ jackets: No  Hot tub/jacuzzi/sauna: No  Humidifier: No  Hobbies- woodworking, brass or woodwind instruments, pottery, gardening: No  Chemotherapy: No    ROS: 12 point ROS negative except mentioned in HPI.    Rheumatic ROS: No dry eyes, dry mouth, raynauds, photosensitivity, joint stiffness/pain > 1 hour, blood in urine        Past Medical History:   Diagnosis Date    Acne     Benign prostate hyperplasia     Exercise-induced asthma     Functional scoliosis with rib asymmetry     Hepatitis C virus infection, unspecified chronicity     S/p 8 weeks of Mavyret- now viral load undetectable    Insomnia     Low back pain with sciatica     Mild recurrent major depression     Olecranon bursitis of right elbow     Peripheral polyneuropathy     S/p cervical and lumbar laminectomy    Right shoulder pain               Past Surgical History:   Procedure Laterality Date    ARTHROPLASTY SHOULDER Right 12/30/2021    Procedure: RIGHT TOTAL SHOULDER ARTHROPLASTY WITH NO CUSTOM GUIDE;  Surgeon: Primitivo Lozada MD;  Location: [facility] OR    DISCECTOMY CERVICAL MINIMALLY INVASIVE ONE LEVEL Right 01/17/2017    L3-L4    ENT SURGERY      Tonsillectomy    LAMINECTOMY  2000    Multiple involves L3-S1    LAMINECTOMY      C6-7 and L4-S1    LAMINECTOMY CERIVCAL POSTERIOR TWO LEVELS      1990    right elbow with ulnar nerve transposition and lateral epicondylitis surgery          Social History     Tobacco Use    Smoking status: Former     Current  "packs/day: 0.00     Average packs/day: 1 pack/day for 20.0 years (20.0 ttl pk-yrs)     Types: Cigarettes     Start date: 1978     Quit date: 1998     Years since quittin.1    Smokeless tobacco: Never   Substance Use Topics    Alcohol use: Not Currently     Comment: Quit 20 years ago    Drug use: Not Currently     Types: \"Crack\" cocaine     Comment: Was a IV drug user- used cocaine 30 years ago                  Family History   Problem Relation Age of Onset    Prostate Cancer Father     Breast Cancer Mother     Skin Cancer Sister           Any family history of ILD:  NA       Current Outpatient Medications   Medication Sig Dispense Refill    DULoxetine HCl 40 MG CPEP Take 1 capsule by mouth daily       fluticasone-salmeterol (ADVAIR) 100-50 MCG/ACT inhaler       GEMTESA 75 MG TABS tablet Take 1 tablet by mouth daily.      Silodosin (RAPAFLO) 8 MG CAPS capsule Take 8 mg by mouth daily       TESTOSTERONE ENANTHATE IJ Inject as directed twice a week       albuterol (PROAIR HFA/PROVENTIL HFA/VENTOLIN HFA) 108 (90 Base) MCG/ACT inhaler Inhale 2 puffs into the lungs every 6 hours (Patient not taking: Reported on 2023)      ASHWAGANDHA PO Take 1 g by mouth daily (Patient not taking: Reported on 2023)      aspirin 81 MG EC tablet Take 1 tablet (81 mg) by mouth 2 times daily (Patient not taking: Reported on 2023) 60 tablet 0    Chorionic Gonadotropin (HCG IJ) Inject as directed twice a week  (Patient not taking: Reported on 2023)      fexofenadine (ALLEGRA) 180 MG tablet Take 180 mg by mouth daily      fexofenadine-pseudoePHEDrine (ALLEGRA-D 24) 180-240 MG 24 hr tablet Take 1 tablet by mouth daily (Patient not taking: Reported on 2023)      fluticasone (FLONASE) 50 MCG/ACT nasal spray Spray 1 spray into both nostrils daily      Melatonin 10 MG TBDP  (Patient not taking: Reported on 2023)      naproxen (NAPROSYN) 500 MG tablet Take 500 mg by mouth as needed for moderate pain (Patient " "not taking: Reported on 2/18/2025)      oxyBUTYnin ER (DITROPAN XL) 10 MG 24 hr tablet       senna-docusate (SENOKOT-S/PERICOLACE) 8.6-50 MG tablet Take 1-2 tablets by mouth 2 times daily Take while on oral narcotics to prevent or treat constipation. (Patient not taking: Reported on 11/7/2023) 30 tablet 0     No current facility-administered medications for this visit.                        Allergies   Allergen Reactions    Cats     Dogs     Seasonal Allergies     Sulfa Antibiotics Rash                 BP (!) 145/79 (BP Location: Right arm, Patient Position: Chair, Cuff Size: Adult Regular)   Pulse 59   Ht 1.778 m (5' 10\")   Wt 70.4 kg (155 lb 3.2 oz)   SpO2 100%   BMI 22.27 kg/m       Body mass index is 22.27 kg/m .        Physical Examination    General: Well developed, well nourished, No apparent distress  Eyes: Anicteric  Nose: Nasal mucosa with no edema or hyperemia.  No polyps  Ears: Hearing grossly normal  Mouth: Oral mucosa is moist, without any lesions. No oropharyngeal exudate.  Respiratory: Good air movement.  Scattered basilar crackles, clears with coughing,  no rhonchi. No wheezes  Cardiac: RRR, normal S1, S2. No murmurs. No JVD  Abdomen: Soft, NT/ND  Musculoskeletal: Mild arthritis +  Skin: No rash on limited exam  Neuro: Normal mentation. Normal speech.  Psych:Normal affect         RESULTS:  Serologies:  11/2/23 (outside): Negative rheumatoid factor   Latest Reference Range & Units 11/07/23 09:34   Aldolase 1.2 - 7.6 U/L 5.1   CK Total 39 - 308 U/L 85   CRP Inflammation <5.00 mg/L <3.00   Cyclic Citrullinated Peptide Antibody, IgG <7.0 U/mL 1.3   Rheumatoid Factor <12 IU/mL <6   Aspergillus Fumagatis 1 Antibody None Detected  None Detected   Aspergillus Fumagatis 6 Antibody None Detected  None Detected   Aureo Pullulans None Detected  None Detected   [place] serum None Detected  None Detected   Micropolyspora Faeni None Detected  None Detected   Sed Rate 0 - 20 mm/hr 10   ANTI NUCLEAR SONAL IGG BY " IFA WITH REFLEX  Rpt !   !: Data is abnormal  MARTI 11/2023:        PFT interpretation: I personally reviewed and interpreted the PFTs-normal spirometry, normal lung volumes and normal diffusion capacity.    Latest Reference Range & Units 11/07/23 07:54 06/18/24 07:21 02/18/25 09:41   FVC-Pred L 4.20 4.18 4.16 (P)   FVC-Pre L 4.43 4.25 4.07 (P)   FVC-%Pred-Pre % 105 101 97 (P)   FEV1-Pre L 3.73 3.54 3.42 (P)   FEV1-%Pred-Pre % 114 108 105 (P)   FEV1FVC-Pred % 78 78 78 (P)   (P): Preliminary     Latest Reference Range & Units 06/18/24 07:21 02/18/25 09:41   DLCOunc-Pred ml/min/mmHg 27.13 27.02 (P)   DLCOunc-Pre ml/min/mmHg 21.15 19.84 (P)   DLCOunc-%Pred-Pre % 77 73 (P)   VA-Pre L 5.88 5.87 (P)   VA-%Pred-Pre % 90 90 (P)       Chest imaging:    Recent Results (from the past 24 hour(s))   CT Chest Hi-Resolution wo Contrast    Narrative    CT chest high resolution without contrast    INDICATION: Pulmonary fibrosis.    COMPARISON: High-resolution CT 11/7/2023    FINDINGS: No contrast. Inspiration and expiration supine imaging  obtained. The included thyroid appears unremarkable. Right shoulder  prosthesis again noted. Prominent aortopulmonary window lymph node  appears similar measuring 2.2 x 0.6 cm, previously 2.0 x 0.8 cm. No  new prominent lymph nodes. Heart size normal. No pleural or  pericardial effusion. Thoracic aortic caliber normal. Pulmonary artery  caliber normal. No suspicious upper abdominal finding.  Bone detail shows mild degenerative spurring in the thoracolumbar  junction region of the spine. No suspicious sclerotic or  lytic/destructive lesion.    Detail of the lungs shows scattered 2 mm right upper lobe pulmonary  nodules also with granulomas in the left upper lobe. No dominant  pulmonary nodules or consolidations. There is mild subpleural  reticular opacification with very slight lower lobe predominance over  upper lobe prominence. This appearance is unchanged.  Expiratory imaging does show some air  trapping.      Impression    IMPRESSION: Findings again indeterminate for UIP-type pneumonia.  Cannot entirely exclude respiratory bronchiolitis-interstitial lung  disease or fibrotic nonspecific interstitial pneumonia or fibrotic  hypersensitivity pneumonitis (if applicable exposure history is  present). Scattered unchanged almost certainly benign pulmonary  nodules. 12 month follow-up CT again recommended to ensure stability.  Prominent aortopulmonary window lymph node just under a centimeter in  short axis over 2 cm in long axis grossly unchanged.    SYLVIA FRYE MD         SYSTEM ID:  [id number]     I personally reviewed and interpreted the chest radiographs.    The longitudinal plan of care for post lung transplant and complications of chronic lung disease was addressed during this visit. Due to the added complexity in care, I will continue to support this patient in the subsequent management of this condition(s) and with the ongoing continuity of care of this condition

## 2025-02-18 NOTE — LETTER
2/18/2025      Malachi Domínguez  [address]  [facility] [address]      Dear Colleague,    Thank you for referring your patient, Malachi Domínguez, to the [facility]. Please see a copy of my visit note below.      [facility] Interstitial Lung Disease Program          Date of Visit: February 18, 2025   Clinic Location:     [facility] [address] [address]  Phone: [phone]  Fax: [phone]        ASSESSMENT:  ILD: Victorino has evidence of mild pulmonary fibrosis based on review of high-resolution CT scan imaging.  He has no pulmonary function limitation, is asymptomatic, and has not experienced any desaturation on the 6-minute walk test.   Imaging characteristics indeterminate for UIP, but could be evolving. High-resolution CT chest 6/18/2024 demonstrates stability of pulmonary fibrosis.  He also has scattered unchanged benign pulmonary nodules.  Prominent aortopulmonary window lymph nodes that appears benign in nature as well.  No characteristic features of pneumoconiosis although does have some exposure history.  Minocycline in very rare situations can cause interstitial lung disease, although this tends to be an acute form of interstitial pneumonia.  It is unlikely that his interstitial lung disease is secondary to Minocycline, but he is already off this medication and I recommended him to abstain from using it if he is okay with it.  MARTI 1:160 positive, nucleolar pattern 11/2023.  Hypersensitivity panel negative. Rheumatology evaluation yielded mildly positive antihistone antibodies ( 2/2024-Radha).   Disease monitoring: Victorino has mild pulmonary fibrosis.  However, his pulmonary function has declined on spirometry today although things are unchanged as far as symptoms.  We will repeat PFT, 6-minute walk test and a high-res CT scan in 4  months  Pulmonary hypertension: Currently low suspicion.  No indication for echocardiogram.  Immunomodulatory therapy and monitoring: Not indicated  Antifibrotic therapy: With mild pulmonary fibrosis and normal pulmonary function, will hold off on initiating antifibrotic therapy although we discussed briefly the 2 agents and there benefits versus adverse effects profile.   Oxygen supplementation: Currently not indicated.  Pulmonary rehabilitation: Currently not indicated.  Rheumatology evaluation-[facility]-and that some suspicion for undifferentiated connective tissue disorder.  All antibodies have been negative except antihistone antibodies that were mildly positive.    Esophageal evaluation demonstrated mild esophageal dysmotility but he is off PPI due to intolerance and is currently asymptomatic.  At this point, no overt evidence of well-differentiated connective tissue disorder.  Given progression lung disease and intolerance to PPI, I have ordered a referral to [facility] for assessment of GERD and dysmotility.  History of asthma: Seems to be well controlled.  He may have mild emphysema based on my interpretation of his CT scan imaging today.  In any case, he has not had to use albuterol for rescue purposes.  Currently, he is on combination therapy with fluticasone and salmeterol prescribed his primary care physician and reports modest benefit.  He can continue the same for maintenance.  Use albuterol for rescue purposes.    Other issues:  History of tobaco use-quit 1995  History of crack cocaine ( IV)-remote  History of chronic minocyline use ( acne)  RTC: 6 months with PFT, 6MWT.   I spent a total of 45  minutes reviewing chart (previous notes), reviewing test results, reviewing chest x rays and CT scans, talking with and examining patient, formulating plan, adjusting medications, and documentation of my findings and plan on the day of the encounter. Time excludes time spent for PFT.   Buster Hurt MD  FCCP  Associate Professor of Medicine  Division of Pulmonary, Allergy & Critical Care   [facility]  [facility]      Chief Complaint:   Malachi Domínguez is a 63 year old year old male who is being seen for Follow Up (Return ILD )    HPI:    February 18, 2025 : Malachi Domínguez returns for follow-up.  Last seen 6/2024.  Since then, he reports mostly feeling about the same.  He has occasional cough that resolved spontaneously.  He is using albuterol and inhalers as previously prescribed.  He has not required any hospitalization or courses of steroids.  No fever, chills, night sweats, significant weight changes reported today.    Initial HPI:    Malachi Domínguez is a 63 year old  year old male who presents for evaluation and management of suspected ILD. Briefly, Malachi Domínguez has a history of long-standing asthma for which he follows with Dr Keny Florez at [place]. He has been maintained on Flovent inhaler for a long time although he admits he has not been using it consistently.  Victorino reports that around 3 months ago he started noticing more episodes of coughing, intermittent wheezing and worsening shortness of breath.  He also had significant runny nose and chest congestion.  He denied fever, chills over the last few months.  He has never required oxygen.  Per records, for the symptoms, a CT scan of the chest was obtained which was reportedly suspicious for interstitial lung disease, and hence the referral to our clinic today.  Today, Victorino reports that his symptoms have resolved over the last 1 month.  He does not have significant coughing.  He continues to feel congested intermittently.  He quit taking minocycline as was advised by Dr Florez which she had been taking for a long time for acne.  He is not using his albuterol or Flovent anymore.  He feels his breathing is back to baseline.  He denies any limitation with activity as far as his breathing  is concerned and continues to remain very active.      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 He used to work as a , did have some exposure to asbestos and quartz ( transient and not long-term).    Other ( welding, hard metal etc, coffee, mushroom,wood): No    Smoking ( tobacco, marijuana, e-cigarettes, vaping, others): IV cocaine ( 30 years ago), edible marijuana now ( off an on, 5 mg thc,5 mg CBD).  No inhalational exposure anymore.   Drugs (Amiodarone, Bleomycin, Nitrofuranoin, Radiation, Immunotherapy): No  Mold/mildew ( flood, musty basement): No  Birds/ bird droppings (pigeons, doves, parakeets, cockaties, chickens, ducks, geese): No  Feather pillow/blanket/down comforter/ jackets: No  Hot tub/jacuzzi/sauna: No  Humidifier: No  Hobbies- woodworking, brass or woodwind instruments, pottery, gardening: No  Chemotherapy: No    ROS: 12 point ROS negative except mentioned in HPI.    Rheumatic ROS: No dry eyes, dry mouth, raynauds, photosensitivity, joint stiffness/pain > 1 hour, blood in urine        Past Medical History:   Diagnosis Date     Acne      Benign prostate hyperplasia      Exercise-induced asthma      Functional scoliosis with rib asymmetry      Hepatitis C virus infection, unspecified chronicity     S/p 8 weeks of Mavyret- now viral load undetectable     Insomnia      Low back pain with sciatica      Mild recurrent major depression      Olecranon bursitis of right elbow      Peripheral polyneuropathy     S/p cervical and lumbar laminectomy     Right shoulder pain               Past Surgical History:   Procedure Laterality Date     ARTHROPLASTY SHOULDER Right 12/30/2021    Procedure: RIGHT TOTAL SHOULDER ARTHROPLASTY WITH NO CUSTOM GUIDE;  Surgeon: Primitivo Lozada MD;  Location: [facility] OR     DISCECTOMY CERVICAL MINIMALLY INVASIVE ONE LEVEL Right 01/17/2017    L3-L4     ENT SURGERY      Tonsillectomy  "    LAMINECTOMY  2000    Multiple involves L3-S1     LAMINECTOMY      C6-7 and L4-S1     LAMINECTOMY CERIVCAL POSTERIOR TWO LEVELS           right elbow with ulnar nerve transposition and lateral epicondylitis surgery          Social History     Tobacco Use     Smoking status: Former     Current packs/day: 0.00     Average packs/day: 1 pack/day for 20.0 years (20.0 ttl pk-yrs)     Types: Cigarettes     Start date: 1978     Quit date: 1998     Years since quittin.1     Smokeless tobacco: Never   Substance Use Topics     Alcohol use: Not Currently     Comment: Quit 20 years ago     Drug use: Not Currently     Types: \"Crack\" cocaine     Comment: Was a IV drug user- used cocaine 30 years ago                  Family History   Problem Relation Age of Onset     Prostate Cancer Father      Breast Cancer Mother      Skin Cancer Sister           Any family history of ILD:  NA       Current Outpatient Medications   Medication Sig Dispense Refill     DULoxetine HCl 40 MG CPEP Take 1 capsule by mouth daily        fluticasone-salmeterol (ADVAIR) 100-50 MCG/ACT inhaler        GEMTESA 75 MG TABS tablet Take 1 tablet by mouth daily.       Silodosin (RAPAFLO) 8 MG CAPS capsule Take 8 mg by mouth daily        TESTOSTERONE ENANTHATE IJ Inject as directed twice a week        albuterol (PROAIR HFA/PROVENTIL HFA/VENTOLIN HFA) 108 (90 Base) MCG/ACT inhaler Inhale 2 puffs into the lungs every 6 hours (Patient not taking: Reported on 2023)       ASHWAGANDHA PO Take 1 g by mouth daily (Patient not taking: Reported on 2023)       aspirin 81 MG EC tablet Take 1 tablet (81 mg) by mouth 2 times daily (Patient not taking: Reported on 2023) 60 tablet 0     Chorionic Gonadotropin (HCG IJ) Inject as directed twice a week  (Patient not taking: Reported on 2023)       fexofenadine (ALLEGRA) 180 MG tablet Take 180 mg by mouth daily       fexofenadine-pseudoePHEDrine (ALLEGRA-D 24) 180-240 MG 24 hr tablet Take 1 " "tablet by mouth daily (Patient not taking: Reported on 11/7/2023)       fluticasone (FLONASE) 50 MCG/ACT nasal spray Spray 1 spray into both nostrils daily       Melatonin 10 MG TBDP  (Patient not taking: Reported on 11/7/2023)       naproxen (NAPROSYN) 500 MG tablet Take 500 mg by mouth as needed for moderate pain (Patient not taking: Reported on 2/18/2025)       oxyBUTYnin ER (DITROPAN XL) 10 MG 24 hr tablet        senna-docusate (SENOKOT-S/PERICOLACE) 8.6-50 MG tablet Take 1-2 tablets by mouth 2 times daily Take while on oral narcotics to prevent or treat constipation. (Patient not taking: Reported on 11/7/2023) 30 tablet 0     No current facility-administered medications for this visit.                        Allergies   Allergen Reactions     Cats      Dogs      Seasonal Allergies      Sulfa Antibiotics Rash                 BP (!) 145/79 (BP Location: Right arm, Patient Position: Chair, Cuff Size: Adult Regular)   Pulse 59   Ht 1.778 m (5' 10\")   Wt 70.4 kg (155 lb 3.2 oz)   SpO2 100%   BMI 22.27 kg/m       Body mass index is 22.27 kg/m .        Physical Examination    General: Well developed, well nourished, No apparent distress  Eyes: Anicteric  Nose: Nasal mucosa with no edema or hyperemia.  No polyps  Ears: Hearing grossly normal  Mouth: Oral mucosa is moist, without any lesions. No oropharyngeal exudate.  Respiratory: Good air movement.  Scattered basilar crackles, clears with coughing,  no rhonchi. No wheezes  Cardiac: RRR, normal S1, S2. No murmurs. No JVD  Abdomen: Soft, NT/ND  Musculoskeletal: Mild arthritis +  Skin: No rash on limited exam  Neuro: Normal mentation. Normal speech.  Psych:Normal affect         RESULTS:  Serologies:  11/2/23 (outside): Negative rheumatoid factor   Latest Reference Range & Units 11/07/23 09:34   Aldolase 1.2 - 7.6 U/L 5.1   CK Total 39 - 308 U/L 85   CRP Inflammation <5.00 mg/L <3.00   Cyclic Citrullinated Peptide Antibody, IgG <7.0 U/mL 1.3   Rheumatoid Factor <12 " IU/mL <6   Aspergillus Fumagatis 1 Antibody None Detected  None Detected   Aspergillus Fumagatis 6 Antibody None Detected  None Detected   Aureo Pullulans None Detected  None Detected   [place] serum None Detected  None Detected   Micropolyspora Faeni None Detected  None Detected   Sed Rate 0 - 20 mm/hr 10   ANTI NUCLEAR SONAL IGG BY IFA WITH REFLEX  Rpt !   !: Data is abnormal  MARTI 11/2023:        PFT interpretation: I personally reviewed and interpreted the PFTs-normal spirometry, normal lung volumes and normal diffusion capacity.    Latest Reference Range & Units 11/07/23 07:54 06/18/24 07:21 02/18/25 09:41   FVC-Pred L 4.20 4.18 4.16 (P)   FVC-Pre L 4.43 4.25 4.07 (P)   FVC-%Pred-Pre % 105 101 97 (P)   FEV1-Pre L 3.73 3.54 3.42 (P)   FEV1-%Pred-Pre % 114 108 105 (P)   FEV1FVC-Pred % 78 78 78 (P)   (P): Preliminary     Latest Reference Range & Units 06/18/24 07:21 02/18/25 09:41   DLCOunc-Pred ml/min/mmHg 27.13 27.02 (P)   DLCOunc-Pre ml/min/mmHg 21.15 19.84 (P)   DLCOunc-%Pred-Pre % 77 73 (P)   VA-Pre L 5.88 5.87 (P)   VA-%Pred-Pre % 90 90 (P)       Chest imaging:    Recent Results (from the past 24 hour(s))   CT Chest Hi-Resolution wo Contrast    Narrative    CT chest high resolution without contrast    INDICATION: Pulmonary fibrosis.    COMPARISON: High-resolution CT 11/7/2023    FINDINGS: No contrast. Inspiration and expiration supine imaging  obtained. The included thyroid appears unremarkable. Right shoulder  prosthesis again noted. Prominent aortopulmonary window lymph node  appears similar measuring 2.2 x 0.6 cm, previously 2.0 x 0.8 cm. No  new prominent lymph nodes. Heart size normal. No pleural or  pericardial effusion. Thoracic aortic caliber normal. Pulmonary artery  caliber normal. No suspicious upper abdominal finding.  Bone detail shows mild degenerative spurring in the thoracolumbar  junction region of the spine. No suspicious sclerotic or  lytic/destructive lesion.    Detail of the lungs shows  scattered 2 mm right upper lobe pulmonary  nodules also with granulomas in the left upper lobe. No dominant  pulmonary nodules or consolidations. There is mild subpleural  reticular opacification with very slight lower lobe predominance over  upper lobe prominence. This appearance is unchanged.  Expiratory imaging does show some air trapping.      Impression    IMPRESSION: Findings again indeterminate for UIP-type pneumonia.  Cannot entirely exclude respiratory bronchiolitis-interstitial lung  disease or fibrotic nonspecific interstitial pneumonia or fibrotic  hypersensitivity pneumonitis (if applicable exposure history is  present). Scattered unchanged almost certainly benign pulmonary  nodules. 12 month follow-up CT again recommended to ensure stability.  Prominent aortopulmonary window lymph node just under a centimeter in  short axis over 2 cm in long axis grossly unchanged.    SYLVIA FRYE MD         SYSTEM ID:  [id number]     I personally reviewed and interpreted the chest radiographs.    The longitudinal plan of care for post lung transplant and complications of chronic lung disease was addressed during this visit. Due to the added complexity in care, I will continue to support this patient in the subsequent management of this condition(s) and with the ongoing continuity of care of this condition       Again, thank you for allowing me to participate in the care of your patient.        Sincerely,        Buster Hurt MD    Electronically signed

## 2025-02-18 NOTE — NURSING NOTE
Chief Complaint   Patient presents with    Follow Up     Return ILD        Vitals were taken, medications reconciled.    Mohan Worthington, Clinic Assistant   11:57 AM

## 2025-02-19 ENCOUNTER — TELEPHONE (OUTPATIENT)
Dept: GASTROENTEROLOGY | Facility: CLINIC | Age: 65
End: 2025-02-19
Payer: COMMERCIAL

## 2025-02-19 LAB
DLCOUNC-%PRED-PRE: 73 %
DLCOUNC-PRE: 19.84 ML/MIN/MMHG
DLCOUNC-PRED: 27.02 ML/MIN/MMHG
ERV-%PRED-PRE: 81 %
ERV-PRE: 1.26 L
ERV-PRED: 1.54 L
EXPTIME-PRE: 7.03 SEC
FEF2575-%PRED-PRE: 147 %
FEF2575-PRE: 3.92 L/SEC
FEF2575-PRED: 2.66 L/SEC
FEFMAX-%PRED-PRE: 102 %
FEFMAX-PRE: 9.16 L/SEC
FEFMAX-PRED: 8.97 L/SEC
FEV1-%PRED-PRE: 105 %
FEV1-PRE: 3.42 L
FEV1FEV6-PRE: 85 %
FEV1FEV6-PRED: 79 %
FEV1FVC-PRE: 84 %
FEV1FVC-PRED: 78 %
FEV1SVC-PRE: 79 %
FEV1SVC-PRED: 74 %
FIFMAX-PRE: 6.87 L/SEC
FRCPLETH-%PRED-PRE: 83 %
FRCPLETH-PRE: 3.08 L
FRCPLETH-PRED: 3.67 L
FVC-%PRED-PRE: 97 %
FVC-PRE: 4.07 L
FVC-PRED: 4.16 L
IC-%PRED-PRE: 99 %
IC-PRE: 3.06 L
IC-PRED: 3.08 L
RVPLETH-%PRED-PRE: 73 %
RVPLETH-PRE: 1.83 L
RVPLETH-PRED: 2.49 L
TLCPLETH-%PRED-PRE: 86 %
TLCPLETH-PRE: 6.14 L
TLCPLETH-PRED: 7.13 L
VA-%PRED-PRE: 90 %
VA-PRE: 5.87 L
VC-%PRED-PRE: 98 %
VC-PRE: 4.31 L
VC-PRED: 4.39 L

## 2025-02-19 NOTE — TELEPHONE ENCOUNTER
[facility] Call Center    Phone Message    May a detailed message be left on voicemail: Yes    Reason for Call: Other: Patient is currently scheduled on 03/31, as visit type New Esophageal Urgent. This is outside the expected timeline for this referral. Patient has been added to the waitlist.      Action Taken: Message routed to:  Other: GI REFERRAL TRIAGE POOL     Travel Screening: Not Applicable

## 2025-02-19 NOTE — TELEPHONE ENCOUNTER
REFERRAL INFORMATION:  Referring Provider:  Buster Hurt MD   Referring Clinic:   [facility]   Reason for Visit/Diagnosis:   K21.9 (ICD-10-CM) - Gastroesophageal reflux disease without esophagitis   K22.4 (ICD-10-CM) - Esophageal dysfunction        FUTURE VISIT INFORMATION:  Appointment Date: 3/31/25     NOTES STATUS DETAILS   OFFICE NOTE from Referring Provider Internal 2/18/25   MEDICATION LIST Internal    PROCEDURES     STOOL TESTING     LABS     PERTINENT LABS Care Everywhere    IMAGES     CT In process [facility]: in PACS  10/13/23-CT chest    [facility]:  6/18/24, 11/7/23-CT chest   XRAY In process [facility]:  2/23/24-XR esophagus     Records Requested   February 19, 2025 9:48 AM  [email or web address]   Facility  [facility] and [facility]   Outcome Req images

## 2025-03-15 ENCOUNTER — MYC MEDICAL ADVICE (OUTPATIENT)
Dept: PULMONOLOGY | Facility: CLINIC | Age: 65
End: 2025-03-15
Payer: COMMERCIAL

## 2025-03-31 ENCOUNTER — PRE VISIT (OUTPATIENT)
Dept: GASTROENTEROLOGY | Facility: CLINIC | Age: 65
End: 2025-03-31

## 2025-03-31 ENCOUNTER — OFFICE VISIT (OUTPATIENT)
Dept: GASTROENTEROLOGY | Facility: CLINIC | Age: 65
End: 2025-03-31
Attending: INTERNAL MEDICINE
Payer: COMMERCIAL

## 2025-03-31 VITALS
BODY MASS INDEX: 20.72 KG/M2 | HEART RATE: 62 BPM | HEIGHT: 71 IN | WEIGHT: 148 LBS | SYSTOLIC BLOOD PRESSURE: 122 MMHG | OXYGEN SATURATION: 98 % | DIASTOLIC BLOOD PRESSURE: 76 MMHG

## 2025-03-31 DIAGNOSIS — R09.A2 GLOBUS SENSATION: Primary | ICD-10-CM

## 2025-03-31 PROCEDURE — 99203 OFFICE O/P NEW LOW 30 MIN: CPT | Performed by: PHYSICIAN ASSISTANT

## 2025-03-31 PROCEDURE — 3074F SYST BP LT 130 MM HG: CPT | Performed by: PHYSICIAN ASSISTANT

## 2025-03-31 PROCEDURE — 3078F DIAST BP <80 MM HG: CPT | Performed by: PHYSICIAN ASSISTANT

## 2025-03-31 PROCEDURE — 1126F AMNT PAIN NOTED NONE PRSNT: CPT | Performed by: PHYSICIAN ASSISTANT

## 2025-03-31 RX ORDER — CLOMIPHENE CITRATE 50 MG/1
25 TABLET ORAL 2 TIMES WEEKLY
COMMUNITY

## 2025-03-31 ASSESSMENT — PAIN SCALES - GENERAL: PAINLEVEL_OUTOF10: NO PAIN (0)

## 2025-03-31 NOTE — NURSING NOTE
"Do you have a history of colon cancer in your immediate family? NO    If yes who: negative     And what age  were they diagnosed: n/a      Chief Complaint   Patient presents with    New Patient       Vitals:    03/31/25 1242   BP: 122/76   Pulse: 62   SpO2: 98%   Weight: 67.1 kg (148 lb)   Height: 1.803 m (5' 11\")       Body mass index is 20.64 kg/m .    Oj Buenrostro MA      "

## 2025-03-31 NOTE — PATIENT INSTRUCTIONS
It was a pleasure meeting with you today and discussing your healthcare plan. Below is a summary of what we covered:    - Trial of an alginate product after meals and prior to bed --> Reflux Gourmet or Gaviscon with alginate.  These products are best found online rather than in store over-the-counter. Trial for 2-4 weeks and document if there is symptomatic response   - Reflux friendly lifestyle modifications as directed within the AVS, eliminate coffee for 2 weeks and document if there is symptomatic response   - Upper endoscopic evaluation +/- dilation with 96 hour BRAVO study to be completed off acid suppressive therapy.  Please hold your acid suppressive regimen (Omeprazole) for a minimum of 2 weeks prior to the procedure  - Results from the above procedures have been taking approximately 4 to 6 weeks to be finalized.  Please follow-up in clinic accordingly  - Consultation to ENT, schedule at least 1 month after the upper endoscopic evaluation with BRAVO study       Gastroesophageal Reflux Disease (GERD) Lifestyle Modifications:   If taking acid suppression therapy (PPI ie Pantoprazole, Lansoprazole, Omeprazole, Esomeprazole, Rabeprazole, Dexlansoprazole) it should be taken 30 - 60 minutes prior to meals on an empty stomach to have maximum effect  Avoid triggers for reflux such as coffee, chocolate, carbonated beverages, spicy foods, acidic foods (tomato based/citrus and foods with high fat content   Abstinence from alcohol and cessation of all tobacco products is recommended   Studies have shown that weight loss, exercise and maintaining a healthy BMI significantly reduce GERD symptoms   Remain upright while eating and immediately after meals  Do not eat or drink at least 3 hours prior to laying down supine/laying down for bed   Avoid late night/middle of the night snacking    Consider obtaining a wedge pillow or elevating the head end of the bed while sleeping   Avoid sleeping right side down as this can  place the lower part of the esophagus/lower esophageal sphincter in a dependent position that favors reflux   Attempting to eat smaller more frequent meals may improve symptoms         Please call my nurse Marlo ([phone]) or Abigail ([phone]) with any questions or concerns.        See below for any additional questions and scheduling guidelines.    Sign up for [facility]: [facility] patient portal serves as a secure platform for accessing your medical records from the [facility]. Additionally, [facility] facilitates easy, timely, and secure messaging with your care team. If you have not signed up, you may do so by using the provided code or calling [phone].    Coordinating your care after your visit:  There are multiple options for scheduling your follow-up care based on your provider's recommendation.    How do I schedule a follow-up clinic appointment:   After your appointment, you may receive scheduling assistance with the Clinic Coordinators by having a seat in the waiting room and a Clinic Coordinator will call you up to schedule.  Virtual visits or after you leave the clinic:  Your provider has placed a follow-up order in the [facility] portal for scheduling your return appointment. A member of the scheduling team will contact you to schedule.  fundfindrt Scheduling: Timely scheduling through [facility] is advised to ensure appointment availability.   Call to schedule: You may schedule your follow-up appointment(s) by calling [phone], option 1.    How do I schedule my endoscopy or colonoscopy procedure:  If a procedure, such as a colonoscopy or upper endoscopy was ordered by your provider, the scheduling team will contact you to schedule this procedure. Or you may choose to call to schedule at   [phone], option 2.  Please allow 20-30 minutes when scheduling a procedure.    How do I get my blood work done? To get your blood work done, you need to schedule a lab appointment at an [facility]. There are multiple ways to schedule:   At the clinic: The Clinic Coordinator you meet after your visit can help you schedule a lab appointment.   [employer] scheduling: [employer] offers online lab scheduling at all [facility] laboratory locations.   Call to schedule: You can call [phone] to schedule your lab appointment.    How do I schedule my imaging study: To schedule imaging studies, such as CT scans, ultrasounds, MRIs, or X-rays, contact Imaging Services at [phone].    How do I schedule a referral to another doctor: If your provider recommended a referral to another specialist(s), the referral order was placed by your provider. You will receive a phone call to schedule this referral, or you may choose to call the number attached to the referral to self-schedule.    For Post-Visit Question(s):  For any inquiries following today's visit:  Please utilize [employer] messaging and allow 48 hours for reply or contact the Call Center during normal business hours at [phone], option 3.  For Emergent After-hours questions, contact the On-Call GI Fellow through the [facility]  at [phone].  In addition, you may contact your Nurse directly using the provided contact information.    Test Results:  Test results will be accessible via [employer] in compliance with the 21st Century Cures Act. This means that your results will be available to you at the same time as your provider. Often you may see your results before your provider does. Results are reviewed by staff within two weeks with communication follow-up. Results may be released in the patient portal prior to your care team review.    Prescription Refill(s):  Medication prescribed by your provider will be addressed during your visit. For future refills, please coordinate with your pharmacy. If you have not had a recent clinic visit or routine labs, for your safety, your provider may not be able to refill your  prescription.     Clinic Fax Number: [phone]        Sincerely,    Suzie Leo PA-C  Division of Gastroenterology, Hepatology, and Nutrition  [facility]

## 2025-03-31 NOTE — LETTER
"3/31/2025      Malachi Domínguez  [address]  [facility] [address]      Dear Colleague,    Thank you for referring your patient, Malachi Domínguez, to the [facility] [place]. Please see a copy of my visit note below.    Gastroenterology Visit for: Malachi Domínguez 1960   MRN: [id number]     Reason for Visit:  chief complaint    Referred by: Blu  / [address] [place] [address] / [facility] [id number]  Patient Care Team:  Trevin Ash MD as PCP - General (Family Medicine)  Buster Hurt MD as Assigned Pulmonology Provider  Suzie Leo PA-C as Physician Assistant (Gastroenterology)    History of Present Illness:   Malachi Domínguez is a 64 year old male with significant past medical history pertinent for mild pulmonary fibrosis who is presenting as a new patient in consultation at the request of Dr. Hurt with a chief complaint of globus sensation.    -----------------------------------------------------------------------------------------------------------------------------------------------------------------------------------------------------------------------------------  HPI March 31, 2025:    Per transplant team with concern for progression of lung disease and intolerance to PPI consultation therefore GI consultation has been placed.    Dysphagia - Reports seldom symptoms of dysphagia which is occurring to pills a few times per year. Denies dysphagia to solids, semi solids, liquids and pills.     Reports symptoms that are most consistent with a globus sensation when laying down for bed. Stating it is present throughout the duration of the night. If he wakes from sleep this is \"almost always present.\" Described as phlegm. It is the most prevalent when laying down for bed and as well as in the morning upon waking. In the morning when waking this sensation is present for a few minutes at most. Relieved with the consumption of water. Then he is " "asymptomatic for the remainder of the day.     Prescribed Omeprazole resulted in nausea. OTC Omeprazole resulted in \"rotten stomach.\" Trialed the prescription of Omeprazole August of last year and OTC Omeprazole 1 month prior.     Does not eat 2-3 hours prior to bed.     Coffee consumption 4-6 cups per day. Has not tried to eliminate completely.     No carbonation.     Associated with hoarseness of the voice.     Denies weight loss, nausea, emesis, odynophagia, substernal chest pain, neck pain/swelling/mass, heartburn, regurgitation, waterbrash symptoms, dysgeusia, belching, abdominal pain, diarrhea, constipation, melena, hematochezia and BRBR.     History of tobacco use for ~ 10 years 1 pack per day.     Denies current use of tobacco and cannabinoid products.     No family history or GI related malignancy (esophageal, gastric, pancreatic, liver or colon).     Colonoscopy when he turned 61 at [facility]. Reports no polyps with 10 year recall.       Esophageal Questionnaire(s)    BEDQ Questionnaire      3/22/2025     1:35 PM   BEDQ Questionnaire: How Often Have You Had the Following?   Trouble eating solid food (meat, bread, vegetables) 0   Trouble eating soft foods (yogurt, jello, pudding) 0   Trouble swallowing liquids 4   Pain while swallowing 4   Coughing or choking while swallowing foods or liquids 1   Total Score: 9        Patient-reported         3/22/2025     1:35 PM   BEDQ Questionnaire: Discomfort/Pain Ratings   Eating solid food (meat, bread, vegetables) 0   Eating soft foods (yogurt, jello, pudding) 0   Drinking liquid 2   Total Score: 2        Patient-reported       Eckardt Questionnaire      3/22/2025     1:37 PM   Eckardt Questionnaire   Dysphagia 1   Regurgitation 0   Retrosternal Pain 1   Weight Loss (kg) 0   Total Score:  2        Patient-reported       Promis 10 Questionnaire      3/22/2025     1:39 PM   PROMIS 10 FLOWSHEET DATA   In general, would you say your health is: 3   In " general, would you say your quality of life is: 4   In general, how would you rate your physical health? 4   In general, how would you rate your mental health, including your mood and your ability to think? 4   In general, how would you rate your satisfaction with your social activities and relationships? 3   In general, please rate how well you carry out your usual social activities and roles. (This includes activities at home, at work and in your community, and responsibilities as a parent, child, spouse, employee, friend, etc.) 3   To what extent are you able to carry out your everyday physical activities such as walking, climbing stairs, carrying groceries, or moving a chair? 5   In the past 7 days, how often have you been bothered by emotional problems such as feeling anxious, depressed, or irritable? 3   In the past 7 days, how would you rate your fatigue on average? 3   In the past 7 days, how would you rate your pain on average, where 0 means no pain, and 10 means worst imaginable pain? 2   Mental health question re-calculation - no clinical value 3    Physical health question re-calculation - no clinical value 3    Pain question re-calculation - no clinical value 4    Global Mental Health Score 14    Global Physical Health Score 16    PROMIS TOTAL - SUBSCORES 30        Patient-reported           STUDIES & PROCEDURES:    EGD:       Colonoscopy:     EndoFLIP directed at the UES or LES (8cm (EF-325) balloon length or 16cm (EF-322) balloon length):   Date:  8cm balloon  Balloon inflation Balloon pressure CSA (mm^2) DI (mm^2/mmHg) Dmin (mm) Compliance   20 (ladmark ID)        30        40        50           16cm balloon  Balloon inflation Balloon pressure CSA (mm^2) DI (mm^2/mmHg) Dmin (mm) Compliance   30 (ladmark ID)        40        50        60        70           High Resolution Manometry:    PH/Impedance:     Bravo:    CT:    Esophagram:    6/18/2025  Findings: Normal morphology of the esophagus.  No  stricture or mass.  Mild   to moderate dysmotility with proximal escape of the primary wave and   tertiary waves noted in the lower one third of the esophagus.     Spontaneous gastroesophageal reflux was noted to the level of the mid   esophagus.     Fluoroscopy time: 1 minute 18 seconds.     Impression: Mild to moderate esophageal dysmotility.     FL VSS:     GES:    U/S:     XRAY:    Other:       Prior medical records were reviewed including, but not limited to, notes from referring providers, lab work, radiographic tests, and other diagnostic tests. Pertinent results were summarized above.     History     Past Medical History:   Diagnosis Date     Acne      Benign prostate hyperplasia      Exercise-induced asthma      Functional scoliosis with rib asymmetry      Hepatitis C virus infection, unspecified chronicity     S/p 8 weeks of Mavyret- now viral load undetectable     Insomnia      Low back pain with sciatica      Mild recurrent major depression      Olecranon bursitis of right elbow      Peripheral polyneuropathy     S/p cervical and lumbar laminectomy     Right shoulder pain        Past Surgical History:   Procedure Laterality Date     ARTHROPLASTY SHOULDER Right 12/30/2021    Procedure: RIGHT TOTAL SHOULDER ARTHROPLASTY WITH NO CUSTOM GUIDE;  Surgeon: Primitivo Lozada MD;  Location: [facility] OR     DISCECTOMY CERVICAL MINIMALLY INVASIVE ONE LEVEL Right 01/17/2017    L3-L4     ENT SURGERY      Tonsillectomy     LAMINECTOMY  2000    Multiple involves L3-S1     LAMINECTOMY      C6-7 and L4-S1     LAMINECTOMY CERIVCAL POSTERIOR TWO LEVELS      1990     right elbow with ulnar nerve transposition and lateral epicondylitis surgery         Social History     Socioeconomic History     Marital status: Single     Spouse name: Not on file     Number of children: Not on file     Years of education: Not on file     Highest education level: Not on file   Occupational History     Not on file   Tobacco Use     Smoking  "status: Former     Current packs/day: 0.00     Average packs/day: 1 pack/day for 20.0 years (20.0 ttl pk-yrs)     Types: Cigarettes     Start date: 1978     Quit date: 1998     Years since quittin.2     Smokeless tobacco: Never   Substance and Sexual Activity     Alcohol use: Not Currently     Comment: Quit 20 years ago     Drug use: Not Currently     Types: \"Crack\" cocaine     Comment: Was a IV drug user- used cocaine 30 years ago     Sexual activity: Not on file   Other Topics Concern     Parent/sibling w/ CABG, MI or angioplasty before 65F 55M? Not Asked   Social History Narrative     Not on file     Social Drivers of Health     Financial Resource Strain: Not on file   Food Insecurity: Not on file   Transportation Needs: Not on file   Physical Activity: Not on file   Stress: Not on file   Social Connections: Unknown (2022)    [facility]    Social Connections      Frequency of Communication with Friends and Family: Not on file   Interpersonal Safety: Not on file   Housing Stability: Not on file       Family History   Problem Relation Age of Onset     Prostate Cancer Father      Breast Cancer Mother      Skin Cancer Sister      Family history reviewed and edited as appropriate    Medications and Allergies:     Outpatient Encounter Medications as of 3/31/2025   Medication Sig Dispense Refill     clomiPHENE (CLOMID) 50 MG tablet 25 mg 2 times a week.       albuterol (PROAIR HFA/PROVENTIL HFA/VENTOLIN HFA) 108 (90 Base) MCG/ACT inhaler Inhale 2 puffs into the lungs every 6 hours (Patient not taking: Reported on 2023)       ASHWAGANDHA PO Take 1 g by mouth daily (Patient not taking: Reported on 2023)       aspirin 81 MG EC tablet Take 1 tablet (81 mg) by mouth 2 times daily (Patient not taking: Reported on 2023) 60 tablet 0     Chorionic Gonadotropin (HCG IJ) Inject as directed twice a week  " "(Patient not taking: Reported on 11/7/2023)       DULoxetine HCl 40 MG CPEP Take 1 capsule by mouth daily        fexofenadine (ALLEGRA) 180 MG tablet Take 180 mg by mouth daily       fexofenadine-pseudoePHEDrine (ALLEGRA-D 24) 180-240 MG 24 hr tablet Take 1 tablet by mouth daily (Patient not taking: Reported on 11/7/2023)       fluticasone (FLONASE) 50 MCG/ACT nasal spray Spray 1 spray into both nostrils daily       fluticasone-salmeterol (ADVAIR) 100-50 MCG/ACT inhaler        GEMTESA 75 MG TABS tablet Take 1 tablet by mouth daily.       Melatonin 10 MG TBDP  (Patient not taking: Reported on 11/7/2023)       naproxen (NAPROSYN) 500 MG tablet Take 500 mg by mouth as needed for moderate pain (Patient not taking: Reported on 2/18/2025)       oxyBUTYnin ER (DITROPAN XL) 10 MG 24 hr tablet        senna-docusate (SENOKOT-S/PERICOLACE) 8.6-50 MG tablet Take 1-2 tablets by mouth 2 times daily Take while on oral narcotics to prevent or treat constipation. (Patient not taking: Reported on 11/7/2023) 30 tablet 0     Silodosin (RAPAFLO) 8 MG CAPS capsule Take 8 mg by mouth daily        TESTOSTERONE ENANTHATE IJ Inject as directed twice a week        No facility-administered encounter medications on file as of 3/31/2025.        Allergies   Allergen Reactions     Cats      Dogs      Seasonal Allergies      Sulfa Antibiotics Rash        Review of systems:  A full 10 point review of systems was obtained and was negative except for the pertinent positives and negatives stated within the HPI.    Objective Findings:   Physical Exam:    Constitutional: /76   Pulse 62   Ht 1.803 m (5' 11\")   Wt 67.1 kg (148 lb)   SpO2 98%   BMI 20.64 kg/m    General: Alert, cooperative, no distress, well-appearing  Head: Atraumatic, normocephalic, no obvious abnormalities   Eyes: Sclera anicteric, no obvious conjunctival hemorrhage   Nose: Nares normal, no obvious malformation, no obvious rhinorrhea   Respiratory: Resting comfortably, no " "apparent distress, no cough.   Gastrointestinal: Flat non distended  Skin: No jaundice, no obvious rash  Neurologic: AAOx3, no obvious neurologic abnormality  Psychiatric: Normal Affect, appropriate mood  Extremities: No obvious edema, no obvious malformation     Labs, Radiology, Pathology     Lab Results   Component Value Date    HGB 13.8 12/31/2021    HGB 15.3 12/30/2021        Liver Function Studies - No results for input(s): \"PROTTOTAL\", \"ALBUMIN\", \"BILITOTAL\", \"ALKPHOS\", \"AST\", \"ALT\", \"BILIDIRECT\" in the last 53890 hours.       Patient Active Problem List    Diagnosis Date Noted     Postoperative state 12/30/2021     Priority: Medium     High serum testosterone 07/30/2021     Priority: Medium     Low serum luteinizing hormone (LH) 07/30/2021     Priority: Medium     Low serum follicle stimulating hormone (FSH) 07/30/2021     Priority: Medium     iorJOINT PAIN-UP/ARM 12/06/2005     Priority: Medium     iorPOSTSURGICAL STATES NEC 12/06/2005     Priority: Medium     iorACCIDENT ON INDUSTR PREMISES 12/06/2005     Priority: Medium     Overexertion and strenuous and repetitive movements or loads 12/06/2005     Priority: Medium     Problem list name updated by automated process. Provider to review and confirm  [employer] Update utility        Assessment and Plan   Assessment/Plan:    Malachi Domínguez is a 64 year old male with significant past medical history pertinent for mild pulmonary fibrosis who is presenting as a new patient in consultation at the request of Dr. Hurt with a chief complaint of globus sensation.    #Globus   #Hoarseness   Today Victorino presents with symptoms that are more consistent with globus sensation rather than reflux disease or dysphagia.  Associated symptoms include increased mucus/phlegm production and hoarseness of the voice.    As can denies classic manifestations of reflux disease and is presenting with extra esophageal manifestations of GERD we will proceed with objective pH monitoring off " acid suppressive therapy.  In addition ENT consultation will be obtained for direct laryngoscopy to evaluate for any structural abnormalities of the oropharynx.    The differential for globus includes UES dysfunction/esophageal dysmotility, reflux, EoE, structural abnormalities of the oropharynx/hypopharynx/larynx, irritable larynx syndrome, muscle tension dysphagia vs visceral hypersensitivity/DGBI.     - Once additional conditions as listed above/reviewed in office visit are excluded from differential be reassured that globus in itself is a benign disorder  - Trial of an alginate product after meals and prior to bed --> Reflux Gourmet or Gaviscon with alginate.  These products are best found online rather than in store over-the-counter. Trial for 2-4 weeks and document if there is symptomatic response   - Reflux friendly lifestyle modifications as directed within the AVS, eliminate coffee for 2 weeks and document if there is symptomatic response   - Upper endoscopic evaluation +/- dilation with 96 hour BRAVO study to be completed off acid suppressive therapy.  Please hold your acid suppressive regimen (Omeprazole) for a minimum of 2 weeks prior to the procedure  - Results from the above procedures have been taking approximately 4 to 6 weeks to be finalized.  Please follow-up in clinic accordingly  - Consultation to ENT, schedule at least 1 month after the upper endoscopic evaluation with BRAVO study     Follow up plan:   Return to clinic 3 months and as needed.    The risks and benefits of my recommendations, as well as other treatment options were discussed with the patient and any available family today. All questions were answered.     Follow up: As planned above. Today, I personally spent 18 minutes in direct face to face time with the patient. Approximately 12 minutes were spent on indirect care associated with the patient's consultation including but not limited to review of: patient medical records to date,  clinic visits, hospital records, lab results, imaging studies, procedural documentation, coordinating care with other providers and further activities per the note. The findings from this review are summarized in the above note. All of the above accounted for a cumulative time of 30 minutes and was performed on the date of service.     The patient verbalized understanding of the plan and was appreciative for the time spent and information provided during the office visit.           Suzie Leo PA-C  Division of Gastroenterology, Hepatology, and Nutrition  [facility]       Documentation assisted by voice recognition and documentation system.            Again, thank you for allowing me to participate in the care of your patient.        Sincerely,        Suzie Leo PA-C    Electronically signed

## 2025-03-31 NOTE — PROGRESS NOTES
"Gastroenterology Visit for: Malachi Domínguez 1960   MRN: [id number]     Reason for Visit:  chief complaint    Referred by: Blu  / 9 [facility] / [facility] [id number]  Patient Care Team:  Trevin Ash MD as PCP - General (Family Medicine)  Buster Hurt MD as Assigned Pulmonology Provider  Suzie Leo PA-C as Physician Assistant (Gastroenterology)    History of Present Illness:   Malachi Domínguez is a 64 year old male with significant past medical history pertinent for mild pulmonary fibrosis who is presenting as a new patient in consultation at the request of Dr. Hurt with a chief complaint of globus sensation.    -----------------------------------------------------------------------------------------------------------------------------------------------------------------------------------------------------------------------------------  HPI March 31, 2025:    Per transplant team with concern for progression of lung disease and intolerance to PPI consultation therefore GI consultation has been placed.    Dysphagia - Reports seldom symptoms of dysphagia which is occurring to pills a few times per year. Denies dysphagia to solids, semi solids, liquids and pills.     Reports symptoms that are most consistent with a globus sensation when laying down for bed. Stating it is present throughout the duration of the night. If he wakes from sleep this is \"almost always present.\" Described as phlegm. It is the most prevalent when laying down for bed and as well as in the morning upon waking. In the morning when waking this sensation is present for a few minutes at most. Relieved with the consumption of water. Then he is asymptomatic for the remainder of the day.     Prescribed Omeprazole resulted in nausea. OTC Omeprazole resulted in \"rotten stomach.\" Trialed the prescription of Omeprazole August of last year and OTC Omeprazole 1 month prior.     Does not eat 2-3 hours prior to bed.     Coffee " consumption 4-6 cups per day. Has not tried to eliminate completely.     No carbonation.     Associated with hoarseness of the voice.     Denies weight loss, nausea, emesis, odynophagia, substernal chest pain, neck pain/swelling/mass, heartburn, regurgitation, waterbrash symptoms, dysgeusia, belching, abdominal pain, diarrhea, constipation, melena, hematochezia and BRBR.     History of tobacco use for ~ 10 years 1 pack per day.     Denies current use of tobacco and cannabinoid products.     No family history or GI related malignancy (esophageal, gastric, pancreatic, liver or colon).     Colonoscopy when he turned 61 at [facility]. Reports no polyps with 10 year recall.       Esophageal Questionnaire(s)    BEDQ Questionnaire      3/22/2025     1:35 PM   BEDQ Questionnaire: How Often Have You Had the Following?   Trouble eating solid food (meat, bread, vegetables) 0   Trouble eating soft foods (yogurt, jello, pudding) 0   Trouble swallowing liquids 4   Pain while swallowing 4   Coughing or choking while swallowing foods or liquids 1   Total Score: 9        Patient-reported         3/22/2025     1:35 PM   BEDQ Questionnaire: Discomfort/Pain Ratings   Eating solid food (meat, bread, vegetables) 0   Eating soft foods (yogurt, jello, pudding) 0   Drinking liquid 2   Total Score: 2        Patient-reported       Eckardt Questionnaire      3/22/2025     1:37 PM   Eckardt Questionnaire   Dysphagia 1   Regurgitation 0   Retrosternal Pain 1   Weight Loss (kg) 0   Total Score:  2        Patient-reported       Promis 10 Questionnaire      3/22/2025     1:39 PM   PROMIS 10 FLOWSHEET DATA   In general, would you say your health is: 3   In general, would you say your quality of life is: 4   In general, how would you rate your physical health? 4   In general, how would you rate your mental health, including your mood and your ability to think? 4   In general, how would you rate your satisfaction with your social  activities and relationships? 3   In general, please rate how well you carry out your usual social activities and roles. (This includes activities at home, at work and in your community, and responsibilities as a parent, child, spouse, employee, friend, etc.) 3   To what extent are you able to carry out your everyday physical activities such as walking, climbing stairs, carrying groceries, or moving a chair? 5   In the past 7 days, how often have you been bothered by emotional problems such as feeling anxious, depressed, or irritable? 3   In the past 7 days, how would you rate your fatigue on average? 3   In the past 7 days, how would you rate your pain on average, where 0 means no pain, and 10 means worst imaginable pain? 2   Mental health question re-calculation - no clinical value 3    Physical health question re-calculation - no clinical value 3    Pain question re-calculation - no clinical value 4    Global Mental Health Score 14    Global Physical Health Score 16    PROMIS TOTAL - SUBSCORES 30        Patient-reported           STUDIES & PROCEDURES:    EGD:       Colonoscopy:     EndoFLIP directed at the UES or LES (8cm (EF-325) balloon length or 16cm (EF-322) balloon length):   Date:  8cm balloon  Balloon inflation Balloon pressure CSA (mm^2) DI (mm^2/mmHg) Dmin (mm) Compliance   20 (ladmark ID)        30        40        50           16cm balloon  Balloon inflation Balloon pressure CSA (mm^2) DI (mm^2/mmHg) Dmin (mm) Compliance   30 (ladmark ID)        40        50        60        70           High Resolution Manometry:    PH/Impedance:     Bravo:    CT:    Esophagram:    6/18/2025  Findings: Normal morphology of the esophagus.  No stricture or mass.  Mild   to moderate dysmotility with proximal escape of the primary wave and   tertiary waves noted in the lower one third of the esophagus.     Spontaneous gastroesophageal reflux was noted to the level of the mid   esophagus.     Fluoroscopy time: 1 minute 18  seconds.     Impression: Mild to moderate esophageal dysmotility.     FL VSS:     GES:    U/S:     XRAY:    Other:       Prior medical records were reviewed including, but not limited to, notes from referring providers, lab work, radiographic tests, and other diagnostic tests. Pertinent results were summarized above.     History     Past Medical History:   Diagnosis Date    Acne     Benign prostate hyperplasia     Exercise-induced asthma     Functional scoliosis with rib asymmetry     Hepatitis C virus infection, unspecified chronicity     S/p 8 weeks of Mavyret- now viral load undetectable    Insomnia     Low back pain with sciatica     Mild recurrent major depression     Olecranon bursitis of right elbow     Peripheral polyneuropathy     S/p cervical and lumbar laminectomy    Right shoulder pain        Past Surgical History:   Procedure Laterality Date    ARTHROPLASTY SHOULDER Right 2021    Procedure: RIGHT TOTAL SHOULDER ARTHROPLASTY WITH NO CUSTOM GUIDE;  Surgeon: Primitivo Lozada MD;  Location: [facility] OR    DISCECTOMY CERVICAL MINIMALLY INVASIVE ONE LEVEL Right 2017    L3-L4    ENT SURGERY      Tonsillectomy    LAMINECTOMY  2000    Multiple involves L3-S1    LAMINECTOMY      C6-7 and L4-S1    LAMINECTOMY CERIVCAL POSTERIOR TWO LEVELS          right elbow with ulnar nerve transposition and lateral epicondylitis surgery         Social History     Socioeconomic History    Marital status: Single     Spouse name: Not on file    Number of children: Not on file    Years of education: Not on file    Highest education level: Not on file   Occupational History    Not on file   Tobacco Use    Smoking status: Former     Current packs/day: 0.00     Average packs/day: 1 pack/day for 20.0 years (20.0 ttl pk-yrs)     Types: Cigarettes     Start date: 1978     Quit date: 1998     Years since quittin.2    Smokeless tobacco: Never   Substance and Sexual Activity    Alcohol use: Not Currently      "Comment: Quit 20 years ago    Drug use: Not Currently     Types: \"Crack\" cocaine     Comment: Was a IV drug user- used cocaine 30 years ago    Sexual activity: Not on file   Other Topics Concern    Parent/sibling w/ CABG, MI or angioplasty before 65F 55M? Not Asked   Social History Narrative    Not on file     Social Drivers of Health     Financial Resource Strain: Not on file   Food Insecurity: Not on file   Transportation Needs: Not on file   Physical Activity: Not on file   Stress: Not on file   Social Connections: Unknown (12/27/2022)    [facility]    Social Connections     Frequency of Communication with Friends and Family: Not on file   Interpersonal Safety: Not on file   Housing Stability: Not on file       Family History   Problem Relation Age of Onset    Prostate Cancer Father     Breast Cancer Mother     Skin Cancer Sister      Family history reviewed and edited as appropriate    Medications and Allergies:     Outpatient Encounter Medications as of 3/31/2025   Medication Sig Dispense Refill    clomiPHENE (CLOMID) 50 MG tablet 25 mg 2 times a week.      albuterol (PROAIR HFA/PROVENTIL HFA/VENTOLIN HFA) 108 (90 Base) MCG/ACT inhaler Inhale 2 puffs into the lungs every 6 hours (Patient not taking: Reported on 11/7/2023)      ASHWAGANDHA PO Take 1 g by mouth daily (Patient not taking: Reported on 11/7/2023)      aspirin 81 MG EC tablet Take 1 tablet (81 mg) by mouth 2 times daily (Patient not taking: Reported on 11/7/2023) 60 tablet 0    Chorionic Gonadotropin (HCG IJ) Inject as directed twice a week  (Patient not taking: Reported on 11/7/2023)      DULoxetine HCl 40 MG CPEP Take 1 capsule by mouth daily       fexofenadine (ALLEGRA) 180 MG tablet Take 180 mg by mouth daily      fexofenadine-pseudoePHEDrine (ALLEGRA-D 24) 180-240 MG 24 hr tablet Take 1 tablet by mouth daily (Patient not taking: Reported on 11/7/2023)   " "   fluticasone (FLONASE) 50 MCG/ACT nasal spray Spray 1 spray into both nostrils daily      fluticasone-salmeterol (ADVAIR) 100-50 MCG/ACT inhaler       GEMTESA 75 MG TABS tablet Take 1 tablet by mouth daily.      Melatonin 10 MG TBDP  (Patient not taking: Reported on 11/7/2023)      naproxen (NAPROSYN) 500 MG tablet Take 500 mg by mouth as needed for moderate pain (Patient not taking: Reported on 2/18/2025)      oxyBUTYnin ER (DITROPAN XL) 10 MG 24 hr tablet       senna-docusate (SENOKOT-S/PERICOLACE) 8.6-50 MG tablet Take 1-2 tablets by mouth 2 times daily Take while on oral narcotics to prevent or treat constipation. (Patient not taking: Reported on 11/7/2023) 30 tablet 0    Silodosin (RAPAFLO) 8 MG CAPS capsule Take 8 mg by mouth daily       TESTOSTERONE ENANTHATE IJ Inject as directed twice a week        No facility-administered encounter medications on file as of 3/31/2025.        Allergies   Allergen Reactions    Cats     Dogs     Seasonal Allergies     Sulfa Antibiotics Rash        Review of systems:  A full 10 point review of systems was obtained and was negative except for the pertinent positives and negatives stated within the HPI.    Objective Findings:   Physical Exam:    Constitutional: /76   Pulse 62   Ht 1.803 m (5' 11\")   Wt 67.1 kg (148 lb)   SpO2 98%   BMI 20.64 kg/m    General: Alert, cooperative, no distress, well-appearing  Head: Atraumatic, normocephalic, no obvious abnormalities   Eyes: Sclera anicteric, no obvious conjunctival hemorrhage   Nose: Nares normal, no obvious malformation, no obvious rhinorrhea   Respiratory: Resting comfortably, no apparent distress, no cough.   Gastrointestinal: Flat non distended  Skin: No jaundice, no obvious rash  Neurologic: AAOx3, no obvious neurologic abnormality  Psychiatric: Normal Affect, appropriate mood  Extremities: No obvious edema, no obvious malformation     Labs, Radiology, Pathology     Lab Results   Component Value Date    HGB 13.8 " "12/31/2021    HGB 15.3 12/30/2021        Liver Function Studies - No results for input(s): \"PROTTOTAL\", \"ALBUMIN\", \"BILITOTAL\", \"ALKPHOS\", \"AST\", \"ALT\", \"BILIDIRECT\" in the last 43456 hours.       Patient Active Problem List    Diagnosis Date Noted    Postoperative state 12/30/2021     Priority: Medium    High serum testosterone 07/30/2021     Priority: Medium    Low serum luteinizing hormone (LH) 07/30/2021     Priority: Medium    Low serum follicle stimulating hormone (FSH) 07/30/2021     Priority: Medium    iorJOINT PAIN-UP/ARM 12/06/2005     Priority: Medium    iorPOSTSURGICAL STATES NEC 12/06/2005     Priority: Medium    iorACCIDENT ON INDUSTR PREMISES 12/06/2005     Priority: Medium    Overexertion and strenuous and repetitive movements or loads 12/06/2005     Priority: Medium     Problem list name updated by automated process. Provider to review and confirm  Im[employer] Update utility        Assessment and Plan   Assessment/Plan:    Malachi Domínguez is a 64 year old male with significant past medical history pertinent for mild pulmonary fibrosis who is presenting as a new patient in consultation at the request of Dr. Hurt with a chief complaint of globus sensation.    #Globus   #Hoarseness   Today Victorino presents with symptoms that are more consistent with globus sensation rather than reflux disease or dysphagia.  Associated symptoms include increased mucus/phlegm production and hoarseness of the voice.    As can denies classic manifestations of reflux disease and is presenting with extra esophageal manifestations of GERD we will proceed with objective pH monitoring off acid suppressive therapy.  In addition ENT consultation will be obtained for direct laryngoscopy to evaluate for any structural abnormalities of the oropharynx.    The differential for globus includes UES dysfunction/esophageal dysmotility, reflux, EoE, structural abnormalities of the oropharynx/hypopharynx/larynx, irritable larynx syndrome, muscle " tension dysphagia vs visceral hypersensitivity/DGBI.     - Once additional conditions as listed above/reviewed in office visit are excluded from differential be reassured that globus in itself is a benign disorder  - Trial of an alginate product after meals and prior to bed --> Reflux Gourmet or Gaviscon with alginate.  These products are best found online rather than in store over-the-counter. Trial for 2-4 weeks and document if there is symptomatic response   - Reflux friendly lifestyle modifications as directed within the AVS, eliminate coffee for 2 weeks and document if there is symptomatic response   - Upper endoscopic evaluation +/- dilation with 96 hour BRAVO study to be completed off acid suppressive therapy.  Please hold your acid suppressive regimen (Omeprazole) for a minimum of 2 weeks prior to the procedure  - Results from the above procedures have been taking approximately 4 to 6 weeks to be finalized.  Please follow-up in clinic accordingly  - Consultation to ENT, schedule at least 1 month after the upper endoscopic evaluation with BRAVO study     Follow up plan:   Return to clinic 3 months and as needed.    The risks and benefits of my recommendations, as well as other treatment options were discussed with the patient and any available family today. All questions were answered.     Follow up: As planned above. Today, I personally spent 18 minutes in direct face to face time with the patient. Approximately 12 minutes were spent on indirect care associated with the patient's consultation including but not limited to review of: patient medical records to date, clinic visits, hospital records, lab results, imaging studies, procedural documentation, coordinating care with other providers and further activities per the note. The findings from this review are summarized in the above note. All of the above accounted for a cumulative time of 30 minutes and was performed on the date of service.     The patient  verbalized understanding of the plan and was appreciative for the time spent and information provided during the office visit.           Suzie Leo PA-C  Division of Gastroenterology, Hepatology, and Nutrition  [facility]       Documentation assisted by voice recognition and documentation system.

## 2025-04-08 ENCOUNTER — TELEPHONE (OUTPATIENT)
Dept: GASTROENTEROLOGY | Facility: CLINIC | Age: 65
End: 2025-04-08
Payer: COMMERCIAL

## 2025-04-08 ENCOUNTER — HOSPITAL ENCOUNTER (OUTPATIENT)
Facility: AMBULATORY SURGERY CENTER | Age: 65
End: 2025-04-08
Attending: INTERNAL MEDICINE
Payer: COMMERCIAL

## 2025-04-08 NOTE — TELEPHONE ENCOUNTER
Pre visit planning completed.      Procedure details:    Patient scheduled for Upper endoscopy (EGD) with BRAVO capsule placement on 4/21/25.     Arrival time: 0845. Procedure time 1015    Facility location: [facility]; [address] [place] [address], [address] Floor, [place] [address]. Check in location: 5th Floor.    Sedation type: MAC    Pre op exam needed? No.    Indication for procedure: globus sensation      Chart review:     Electronic implanted devices? No    Recent diagnosis of diverticulitis within the last 6 weeks? No      Medication review:    Diabetic? No    Anticoagulants? No    Weight loss medication/injectable? No GLP-1 medication per patient's medication list. Nursing to verify with pre-assessment call.    Other medication HOLDING recommendations:  BRAVO: Does patient have a nickel allergy? No nickel allergy listed. Nursing to verify during pre-assessment call   PPIs/Acid reducing medication(s): HOLD 2 weeks before procedure.      Prep for procedure:     Bowel prep recommendation: N/A  Procedure information and instructions sent via [facility] RN  Endoscopy Procedure Pre Assessment   [phone] option 3

## 2025-04-08 NOTE — TELEPHONE ENCOUNTER
"Endoscopy Scheduling Screen    Have you had any respiratory illness or flu-like symptoms in the last 10 days?  No    What is your communication preference for Instructions and/or Bowel Prep?   MyChart    What insurance is in the chart?  Other:  [facility]    Ordering/Referring Provider: Suzie Leo PA-C     (If ordering provider performs procedure, schedule with ordering provider unless otherwise instructed. )    BMI: Estimated body mass index is 20.64 kg/m  as calculated from the following:    Height as of 3/31/25: 1.803 m (5' 11\").    Weight as of 3/31/25: 67.1 kg (148 lb).     Sedation Ordered  MAC/deep sedation.   BMI<= 45 45 < BMI <= 48 48 < BMI < = 50  BMI > 50   No Restrictions No [facility] ASC  No [facility]  [facility] with exceptions Hospital Only OR Only       Do you have a history of malignant hyperthermia?  No    (Females) Are you currently pregnant?   No     Have you been diagnosed or told you have pulmonary hypertension?   No    Do you have an LVAD?  No    Have you been told you have moderate to severe sleep apnea?  No.    Have you been told you have COPD, asthma, or any other lung disease?  Yes     What breathing problems do you have?  Asthma     Do you use home oxygen?  No    Have your breathing problems required an ED visit or hospitalization in the last year?  No.    Has your doctor ordered any cardiac tests like echo, angiogram, stress test, ablation, or EKG, that you have not completed yet?  No    Do you  have a history of any heart conditions?  No     Have you ever had or are you waiting for an organ transplant?  No. Continue scheduling, no site restrictions.    Have you had a stroke or transient ischemic attack (TIA aka \"mini stroke\") in the last 2 years?   No.    Have you been diagnosed with or been told you have cirrhosis of the liver?   No.    Are you currently on dialysis?   No    Do you need assistance transferring?   No    BMI: Estimated body mass index is 20.64 kg/m  as calculated from the " "following:    Height as of 3/31/25: 1.803 m (5' 11\").    Weight as of 3/31/25: 67.1 kg (148 lb).     Is patients BMI > 40 and scheduling location [facility]?  No    Do you take an injectable or oral medication for weight loss or diabetes (excluding insulin)?  No    Do you take the medication Naltrexone?  No    Do you take blood thinners?  No       Prep   Are you currently on dialysis or do you have chronic kidney disease?  No    Do you have a diagnosis of diabetes?  No    Do you have a diagnosis of cystic fibrosis (CF)?  No    On a regular basis do you go 3 -5 days between bowel movements?  No    BMI > 40?  No    Preferred Pharmacy:    [facility] [id number] [place] [facility] [place], [place] - [address]  [address]  [place] [address]  Phone: [phone] Fax: [phone]      Final Scheduling Details     Procedure scheduled  Upper endoscopy with BRAVO capsule placement    Surgeon:  RANDALL     Date of procedure:  4.21.25     Pre-OP / PAC:   No - Not required for this site.    Location  [facility] - ASC  bravo Availability    Sedation   MAC/Deep Sedation - Per order.      Patient Reminders:   You will receive a call from a Nurse to review instructions and health history.  This assessment must be completed prior to your procedure.  Failure to complete the Nurse assessment may result in the procedure being cancelled.      On the day of your procedure, please designate an adult(s) who can drive you home stay with you for the next 24 hours. The medicines used in the exam will make you sleepy. You will not be able to drive.      You cannot take public transportation, ride share services, or non-medical taxi service without a responsible caregiver.  Medical transport services are allowed with the requirement that a responsible caregiver will receive you at your destination.  We require that drivers and caregivers are confirmed prior to your procedure.  "

## 2025-04-08 NOTE — TELEPHONE ENCOUNTER
Attempted to contact patient in order to complete pre assessment questions.     No answer. Left message to return call to [phone] option 3.    Callback communication sent via [employer].    Michelle Rankin RN

## 2025-04-09 ENCOUNTER — TELEPHONE (OUTPATIENT)
Dept: GASTROENTEROLOGY | Facility: CLINIC | Age: 65
End: 2025-04-09
Payer: COMMERCIAL

## 2025-04-09 NOTE — TELEPHONE ENCOUNTER
Caller: Malachi Domínguez   Reason for Reschedule/Cancellation (please be detailed, any staff messages or encounters to note?):     Peer pt-- cancel only  - no  and cost     Did you cancel or rescheduled an EUS procedure? No.    Is screening questionnaire older than 3 months from the reschedule date.   If Yes, please complete screening questionnaire. No    Prior to reschedule please review:  Ordering Provider:    Suzie Leo PA-C      Sedation Determined: MAC   Does patient have any ASC Exclusions, please identify?: n       Notes on Cancelled Procedure:  Procedure:Upper Endoscopy with BRAVO [EGD BRAVO]   Date: 04/21  Location:[facility]; [address] [place] [address], 5[address] Floor, [place] [address]  Surgeon: Brady         Rescheduled: n

## 2025-05-14 ENCOUNTER — TELEPHONE (OUTPATIENT)
Dept: GASTROENTEROLOGY | Facility: CLINIC | Age: 65
End: 2025-05-14
Payer: COMMERCIAL

## 2025-05-14 ENCOUNTER — HOSPITAL ENCOUNTER (OUTPATIENT)
Facility: AMBULATORY SURGERY CENTER | Age: 65
End: 2025-05-14
Attending: INTERNAL MEDICINE
Payer: COMMERCIAL

## 2025-05-14 NOTE — TELEPHONE ENCOUNTER
"Endoscopy Scheduling Screen    Caller: patient    Have you had any respiratory illness or flu-like symptoms in the last 10 days?  No    What is your communication preference for Instructions and/or Bowel Prep?   Alinet    What insurance is in the chart?  Other:  [facility]/medicare     Ordering/Referring Provider:     Suzie Leo PA-C       (If ordering provider performs procedure, schedule with ordering provider unless otherwise instructed. )    BMI: Estimated body mass index is 20.64 kg/m  as calculated from the following:    Height as of 3/31/25: 1.803 m (5' 11\").    Weight as of 3/31/25: 67.1 kg (148 lb).     Sedation Ordered  MAC/deep sedation.   BMI<= 45 45 < BMI <= 48 48 < BMI < = 50  BMI > 50   No Restrictions No [facility]  No [facility]  [facility] with exceptions Hospital Only OR Only       Do you have a history of malignant hyperthermia?  No    (Females) Are you currently pregnant?   No     Have you been diagnosed or told you have pulmonary hypertension?   No    Do you have an LVAD?  No    Have you been told you have moderate to severe sleep apnea?  No.    Have you been told you have COPD, asthma, or any other lung disease?  No    Has your doctor ordered any cardiac tests like echo, angiogram, stress test, ablation, or EKG, that you have not completed yet?  No    Do you  have a history of any heart conditions?  No     Have you ever had or are you waiting for an organ transplant?  No. Continue scheduling, no site restrictions.    Have you had a stroke or transient ischemic attack (TIA aka \"mini stroke\") in the last 2 years?   No.    Have you been diagnosed with or been told you have cirrhosis of the liver?   No.    Are you currently on dialysis?   No    Do you need assistance transferring?   No    BMI: Estimated body mass index is 20.64 kg/m  as calculated from the following:    Height as of 3/31/25: 1.803 m (5' 11\").    Weight as of 3/31/25: 67.1 kg (148 lb).     Is patients BMI > 40 and scheduling location " [facility]?  No    Do you take an injectable or oral medication for weight loss or diabetes (excluding insulin)?  No    Do you take the medication Naltrexone?  No    Do you take blood thinners?  No       Prep   Are you currently on dialysis or do you have chronic kidney disease?  No    Do you have a diagnosis of diabetes?  No    Do you have a diagnosis of cystic fibrosis (CF)?  No    On a regular basis do you go 3 -5 days between bowel movements?  No    BMI > 40?  No    Preferred Pharmacy:    [facility] [id number] [place] [facility] [address]  [address] [facility] [address]  Phone: [phone] Fax: [phone]      Final Scheduling Details     Procedure scheduled  Upper endoscopy with BRAVO capsule placement    Surgeon:  Brady      Date of procedure:  07/14/2025     Pre-OP / PAC:   No - Not required for this site.    Location  [facility] - ASC - Patient preference.    Sedation   MAC/Deep Sedation - Per order.      Patient Reminders:   You will receive a call from a Nurse to review instructions and health history.  This assessment must be completed prior to your procedure.  Failure to complete the Nurse assessment may result in the procedure being cancelled.      On the day of your procedure, please designate an adult(s) who can drive you home stay with you for the next 24 hours. The medicines used in the exam will make you sleepy. You will not be able to drive.      You cannot take public transportation, ride share services, or non-medical taxi service without a responsible caregiver.  Medical transport services are allowed with the requirement that a responsible caregiver will receive you at your destination.  We require that drivers and caregivers are confirmed prior to your procedure.

## 2025-06-16 ENCOUNTER — TELEPHONE (OUTPATIENT)
Dept: GASTROENTEROLOGY | Facility: CLINIC | Age: 65
End: 2025-06-16
Payer: COMMERCIAL

## 2025-06-16 NOTE — TELEPHONE ENCOUNTER
Caller: Malachi Domínguez      Reason for Reschedule/Cancellation (please be detailed, any staff messages or encounters to note?):   PT WANTS TO GET A SECOND OPINION. PT ALSO STATED THE LOCATION WAS INCONVENIENT FOR THEM.    Did you cancel or rescheduled an EUS procedure? No.    Is screening questionnaire older than 3 months from the reschedule date.   If Yes, please complete screening questionnaire. No    Prior to reschedule please review:  Ordering Provider: Suzie Leo PA-C    Sedation Determined: MAC  Does patient have any ASC Exclusions, please identify?: NO    Notes on Cancelled Procedure:  Procedure: Upper Endoscopy [EGD]   Date: 07/14/2025  Location: [facility]; [address] [place] [address], [address] Floor, [place] [address]  Surgeon: RANDALL    Rescheduled: No,

## 2025-06-17 ENCOUNTER — OFFICE VISIT (OUTPATIENT)
Dept: PULMONOLOGY | Facility: CLINIC | Age: 65
End: 2025-06-17
Attending: INTERNAL MEDICINE
Payer: COMMERCIAL

## 2025-06-17 ENCOUNTER — ANCILLARY PROCEDURE (OUTPATIENT)
Dept: CT IMAGING | Facility: CLINIC | Age: 65
End: 2025-06-17
Attending: INTERNAL MEDICINE
Payer: COMMERCIAL

## 2025-06-17 VITALS
HEIGHT: 70 IN | WEIGHT: 147.9 LBS | BODY MASS INDEX: 21.17 KG/M2 | SYSTOLIC BLOOD PRESSURE: 125 MMHG | DIASTOLIC BLOOD PRESSURE: 78 MMHG | OXYGEN SATURATION: 97 % | HEART RATE: 59 BPM

## 2025-06-17 DIAGNOSIS — J84.9 ILD (INTERSTITIAL LUNG DISEASE) (H): Primary | ICD-10-CM

## 2025-06-17 DIAGNOSIS — J84.9 ILD (INTERSTITIAL LUNG DISEASE) (H): ICD-10-CM

## 2025-06-17 LAB
6 MIN WALK (FT): 1700 FT
6 MIN WALK (M): 518 M
DLCOUNC-%PRED-PRE: 78 %
DLCOUNC-PRE: 21.19 ML/MIN/MMHG
DLCOUNC-PRED: 26.96 ML/MIN/MMHG
ERV-%PRED-PRE: 86 %
ERV-PRE: 1.32 L
ERV-PRED: 1.54 L
EXPTIME-PRE: 6.5 SEC
FEF2575-%PRED-PRE: 152 %
FEF2575-PRE: 4.03 L/SEC
FEF2575-PRED: 2.64 L/SEC
FEFMAX-%PRED-PRE: 101 %
FEFMAX-PRE: 9.07 L/SEC
FEFMAX-PRED: 8.94 L/SEC
FEV1-%PRED-PRE: 112 %
FEV1-PRE: 3.62 L
FEV1FEV6-PRE: 83 %
FEV1FEV6-PRED: 79 %
FEV1FVC-PRE: 83 %
FEV1FVC-PRED: 78 %
FEV1SVC-PRE: 84 %
FEV1SVC-PRED: 73 %
FIFMAX-PRE: 6.3 L/SEC
FRCPLETH-%PRED-PRE: 94 %
FRCPLETH-PRE: 3.47 L
FRCPLETH-PRED: 3.67 L
FVC-%PRED-PRE: 105 %
FVC-PRE: 4.35 L
FVC-PRED: 4.14 L
IC-%PRED-PRE: 97 %
IC-PRE: 3 L
IC-PRED: 3.07 L
RVPLETH-%PRED-PRE: 85 %
RVPLETH-PRE: 2.15 L
RVPLETH-PRED: 2.5 L
TLCPLETH-%PRED-PRE: 90 %
TLCPLETH-PRE: 6.48 L
TLCPLETH-PRED: 7.13 L
VA-%PRED-PRE: 87 %
VA-PRE: 5.72 L
VC-%PRED-PRE: 98 %
VC-PRE: 4.33 L
VC-PRED: 4.38 L

## 2025-06-17 PROCEDURE — 71250 CT THORAX DX C-: CPT | Mod: GC | Performed by: RADIOLOGY

## 2025-06-17 PROCEDURE — G0463 HOSPITAL OUTPT CLINIC VISIT: HCPCS | Performed by: INTERNAL MEDICINE

## 2025-06-17 ASSESSMENT — PAIN SCALES - GENERAL: PAINLEVEL_OUTOF10: NO PAIN (0)

## 2025-06-17 NOTE — PROGRESS NOTES
[facility] Interstitial Lung Disease Program          Date of Visit: June 17, 2025   Clinic Location:     [facility] LUNG SCIENCE AND [address] [address]  Phone: [phone]  Fax: [phone]    ASSESSMENT:  ILD: Victorino has evidence of mild pulmonary fibrosis based on review of high-resolution CT scan imaging.  He has no pulmonary function limitation, is asymptomatic, and has not experienced any desaturation on the 6-minute walk test.   Imaging characteristics indeterminate for UIP, but could be evolving. High-resolution CT chest 6/18/2024 demonstrates stability of pulmonary fibrosis.  He also has scattered unchanged benign pulmonary nodules.  Prominent aortopulmonary window lymph nodes that appears benign in nature as well.  No characteristic features of pneumoconiosis although does have some exposure history.  Minocycline in very rare situations can cause interstitial lung disease, although this tends to be an acute form of interstitial pneumonia.  It is unlikely that his interstitial lung disease is secondary to Minocycline, but he is already off this medication and I recommended him to abstain from using it if he is okay with it.  MARTI 1:160 positive, nucleolar pattern 11/2023.  Hypersensitivity panel negative. Rheumatology evaluation yielded mildly positive antihistone antibodies ( 2/2024-Radha).   Disease monitoring: Victorino has mild pulmonary fibrosis.  His pulmonary function has remained stable on spirometry and he is completely asymptomatic.   Pulmonary hypertension: Currently low suspicion.  No indication for echocardiogram.  Immunomodulatory therapy and monitoring: Not indicated  Antifibrotic therapy: With mild pulmonary fibrosis and normal pulmonary function, will hold off on initiating antifibrotic therapy although we discussed briefly the 2 agents and there benefits versus adverse effects profile.   Oxygen  supplementation: Currently not indicated.  Pulmonary rehabilitation: Currently not indicated.  Rheumatology evaluation-[facility]-and that some suspicion for undifferentiated connective tissue disorder.  All antibodies have been negative except antihistone antibodies that were mildly positive.    Esophageal evaluation demonstrated mild esophageal dysmotility but he is off PPI due to intolerance and is currently asymptomatic.  At this point, no overt evidence of well-differentiated connective tissue disorder.  Given progression lung disease and intolerance to PPI, I have ordered a referral to [facility] for assessment of GERD and dysmotility.  History of asthma: Seems to be well controlled.  He may have mild emphysema based on my interpretation of his CT scan imaging today.  In any case, he has not had to use albuterol for rescue purposes.  Currently, he is on combination therapy with fluticasone and salmeterol prescribed his primary care physician and reports modest benefit.  He can continue the same for maintenance.  Use albuterol for rescue purposes.    Other issues:  History of tobaco use-quit 1995  History of crack cocaine ( IV)-remote  History of chronic minocyline use ( acne)  RTC: 12 months with PFT  I spent a total of 40 minutes reviewing chart (previous notes), reviewing test results, reviewing chest x rays and CT scans, talking with and examining patient, formulating plan, adjusting medications, and documentation of my findings and plan on the day of the encounter. Time excludes time spent for PFT.   Buster Hurt MD [facility]  Associate Professor of Medicine  Division of Pulmonary, Allergy & Critical Care   [facility]  [facility]      Chief Complaint:   Malachi Domínguez is a 63 year old year old male who is being seen for Follow Up (Return ILD)    HPI:    June 17, 2025: Victorino returns for follow-up.  In the interim since last visit, he has not noted any  change in his breathing.  I had sent him to GI for evaluation of GERD and esophageal dysmotility and he is scheduled for pH probe manometry/EGD B[facility].  I encouraged him to get this completed.  No fever, chills, night sweats.  He is compliant with his inhalers.     Initial HPI:    Malachi Domínguez is a 63 year old  year old male who presents for evaluation and management of suspected ILD. Briefly, Malachi Domínguez has a history of long-standing asthma for which he follows with Dr Keny Florez at [place]. He has been maintained on Flovent inhaler for a long time although he admits he has not been using it consistently.  Victorino reports that around 3 months ago he started noticing more episodes of coughing, intermittent wheezing and worsening shortness of breath.  He also had significant runny nose and chest congestion.  He denied fever, chills over the last few months.  He has never required oxygen.  Per records, for the symptoms, a CT scan of the chest was obtained which was reportedly suspicious for interstitial lung disease, and hence the referral to our clinic today.  Today, Victorino reports that his symptoms have resolved over the last 1 month.  He does not have significant coughing.  He continues to feel congested intermittently.  He quit taking minocycline as was advised by Dr Florez which she had been taking for a long time for acne.  He is not using his albuterol or Flovent anymore.  He feels his breathing is back to baseline.  He denies any limitation with activity as far as his breathing is concerned and continues to remain very active.      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 He used to work as a , did have some exposure to asbestos and quartz ( transient and not long-term).    Other ( welding, hard metal etc, coffee, mushroom,wood): No    Smoking ( tobacco, marijuana, e-cigarettes, vaping, others): IV  cocaine ( 30 years ago), edible marijuana now ( off an on, 5 mg thc,5 mg CBD).  No inhalational exposure anymore.   Drugs (Amiodarone, Bleomycin, Nitrofuranoin, Radiation, Immunotherapy): No  Mold/mildew ( flood, musty basement): No  Birds/ bird droppings (pigeons, doves, parakeets, cockaties, chickens, ducks, geese): No  Feather pillow/blanket/down comforter/ jackets: No  Hot tub/jacuzzi/sauna: No  Humidifier: No  Hobbies- woodworking, brass or woodwind instruments, pottery, gardening: No  Chemotherapy: No    ROS: 12 point ROS negative except mentioned in HPI.    Rheumatic ROS: No dry eyes, dry mouth, raynauds, photosensitivity, joint stiffness/pain > 1 hour, blood in urine        Past Medical History:   Diagnosis Date    Acne     Benign prostate hyperplasia     Exercise-induced asthma     Functional scoliosis with rib asymmetry     Hepatitis C virus infection, unspecified chronicity     S/p 8 weeks of Mavyret- now viral load undetectable    Insomnia     Low back pain with sciatica     Mild recurrent major depression     Olecranon bursitis of right elbow     Peripheral polyneuropathy     S/p cervical and lumbar laminectomy    Right shoulder pain               Past Surgical History:   Procedure Laterality Date    ARTHROPLASTY SHOULDER Right 12/30/2021    Procedure: RIGHT TOTAL SHOULDER ARTHROPLASTY WITH NO CUSTOM GUIDE;  Surgeon: Primitivo Lozada MD;  Location: [facility] OR    DISCECTOMY CERVICAL MINIMALLY INVASIVE ONE LEVEL Right 01/17/2017    L3-L4    ENT SURGERY      Tonsillectomy    LAMINECTOMY  2000    Multiple involves L3-S1    LAMINECTOMY      C6-7 and L4-S1    LAMINECTOMY CERIVCAL POSTERIOR TWO LEVELS      1990    right elbow with ulnar nerve transposition and lateral epicondylitis surgery          Social History     Tobacco Use    Smoking status: Former     Current packs/day: 0.00     Average packs/day: 1 pack/day for 20.0 years (20.0 ttl pk-yrs)     Types: Cigarettes     Start date: 1/1/1978     Quit date:  "1998     Years since quittin.4    Smokeless tobacco: Never   Substance Use Topics    Alcohol use: Not Currently     Comment: Quit 20 years ago    Drug use: Not Currently     Types: \"Crack\" cocaine     Comment: Was a IV drug user- used cocaine 30 years ago                  Family History   Problem Relation Age of Onset    Prostate Cancer Father     Breast Cancer Mother     Skin Cancer Sister           Any family history of ILD:  NA       Current Outpatient Medications   Medication Sig Dispense Refill    DULoxetine HCl 40 MG CPEP Take 1 capsule by mouth daily       fluticasone-salmeterol (ADVAIR) 100-50 MCG/ACT inhaler       GEMTESA 75 MG TABS tablet Take 1 tablet by mouth daily.      Silodosin (RAPAFLO) 8 MG CAPS capsule Take 8 mg by mouth daily       albuterol (PROAIR HFA/PROVENTIL HFA/VENTOLIN HFA) 108 (90 Base) MCG/ACT inhaler Inhale 2 puffs into the lungs every 6 hours (Patient not taking: Reported on 2023)      ASHWAGANDHA PO Take 1 g by mouth daily (Patient not taking: Reported on 2023)      aspirin 81 MG EC tablet Take 1 tablet (81 mg) by mouth 2 times daily (Patient not taking: Reported on 2023) 60 tablet 0    Chorionic Gonadotropin (HCG IJ) Inject as directed twice a week  (Patient not taking: Reported on 2023)      clomiPHENE (CLOMID) 50 MG tablet 25 mg 2 times a week.      fexofenadine (ALLEGRA) 180 MG tablet Take 180 mg by mouth daily      fexofenadine-pseudoePHEDrine (ALLEGRA-D 24) 180-240 MG 24 hr tablet Take 1 tablet by mouth daily (Patient not taking: Reported on 2023)      fluticasone (FLONASE) 50 MCG/ACT nasal spray Spray 1 spray into both nostrils daily      Melatonin 10 MG TBDP  (Patient not taking: Reported on 2023)      naproxen (NAPROSYN) 500 MG tablet Take 500 mg by mouth as needed for moderate pain (Patient not taking: Reported on 2025)      oxyBUTYnin ER (DITROPAN XL) 10 MG 24 hr tablet       senna-docusate (SENOKOT-S/PERICOLACE) 8.6-50 MG tablet " "Take 1-2 tablets by mouth 2 times daily Take while on oral narcotics to prevent or treat constipation. (Patient not taking: Reported on 11/7/2023) 30 tablet 0    TESTOSTERONE ENANTHATE IJ Inject as directed twice a week        No current facility-administered medications for this visit.                        Allergies   Allergen Reactions    Cats     Dogs     Seasonal Allergies     Sulfa Antibiotics Rash                 /78 (BP Location: Right arm, Patient Position: Chair, Cuff Size: Adult Regular)   Pulse 59   Ht 1.778 m (5' 10\")   Wt 67.1 kg (147 lb 14.4 oz)   SpO2 97%   BMI 21.22 kg/m       Body mass index is 21.22 kg/m .        Physical Examination    General: Well developed, well nourished, No apparent distress  Eyes: Anicteric  Nose: Nasal mucosa with no edema or hyperemia.  No polyps  Ears: Hearing grossly normal  Mouth: Oral mucosa is moist, without any lesions. No oropharyngeal exudate.  Respiratory: Good air movement.  Scattered basilar crackles, clears with coughing,  no rhonchi. No wheezes  Cardiac: RRR, normal S1, S2. No murmurs. No JVD  Abdomen: Soft, NT/ND  Musculoskeletal: Mild arthritis +  Skin: No rash on limited exam  Neuro: Normal mentation. Normal speech.  Psych:Normal affect         RESULTS:  Serologies:  11/2/23 (outside): Negative rheumatoid factor   Latest Reference Range & Units 11/07/23 09:34   Aldolase 1.2 - 7.6 U/L 5.1   CK Total 39 - 308 U/L 85   CRP Inflammation <5.00 mg/L <3.00   Cyclic Citrullinated Peptide Antibody, IgG <7.0 U/mL 1.3   Rheumatoid Factor <12 IU/mL <6   Aspergillus Fumagatis 1 Antibody None Detected  None Detected   Aspergillus Fumagatis 6 Antibody None Detected  None Detected   Aureo Pullulans None Detected  None Detected   [place] serum None Detected  None Detected   Micropolyspora Faeni None Detected  None Detected   Sed Rate 0 - 20 mm/hr 10   ANTI NUCLEAR SONAL IGG BY IFA WITH REFLEX  Rpt !   !: Data is abnormal  MARTI 11/2023:        PFT interpretation: I " personally reviewed and interpreted the PFTs-normal spirometry, normal lung volumes and normal diffusion capacity.      Latest Reference Range & Units 11/07/23 07:54 06/18/24 07:21 02/18/25 09:41 06/17/25 09:12   FVC-Pred L 4.20 4.18 4.16 4.14 (P)   FVC-Pre L 4.43 4.25 4.07 4.35 (P)   FVC-%Pred-Pre % 105 101 97 105 (P)   FEV1-Pre L 3.73 3.54 3.42 3.62 (P)   FEV1-%Pred-Pre % 114 108 105 112 (P)   FEV1FVC-Pred % 78 78 78 78 (P)   FEV1FVC-Pre % 84 83 84 83 (P)   FEV1SVC-Pred % 74 74 74 73 (P)   FEV1SVC-Pre % 81 80 79 84 (P)   FEV1FEV6-Pred % 79 79 79 79 (P)   FEV1FEV6-Pre % 84 84 85 83 (P)   FEFMax-Pred L/sec 9.07 9.02 8.97 8.94 (P)   FEFMax-Pre L/sec 8.76 9.15 9.16 9.07 (P)   FEFMax-%Pred-Pre % 96 101 102 101 (P)   FEF2575-Pred L/sec 2.73 2.69 2.66 2.64 (P)   FEF2575-Pre L/sec 4.53 4.11 3.92 4.03 (P)   SBD1955-%Pred-Pre % 166 152 147 152 (P)   FIFMax-Pre L/sec 5.76 6.73 6.87 6.30 (P)   ExpTime-Pre sec 6.12 6.25 7.03 6.50 (P)   FRCPleth-Pred L 3.63  3.67 3.67 (P)   FRCPleth-Pre L 3.39  3.08 3.47 (P)   FRCPleth-%Pred-Pre % 93  83 94 (P)   RVPleth-Pred L 2.46  2.49 2.50 (P)   RVPleth-Pre L 2.20  1.83 2.15 (P)   RVPleth-%Pred-Pre % 89  73 85 (P)   TLCPleth-Pred L 7.13  7.13 7.13 (P)   TLCPleth-Pre L 6.81  6.14 6.48 (P)   TLCPleth-%Pred-Pre % 95  86 90 (P)   ERV-Pred L 1.55 1.55 1.54 1.54 (P)   ERV-Pre L 1.19 1.25 1.26 1.32 (P)   ERV-%Pred-Pre % 76 80 81 86 (P)   IC-Pred L 3.11 3.09 3.08 3.07 (P)   IC-Pre L 3.42 3.15 3.06 3.00 (P)   IC-%Pred-Pre % 110 101 99 97 (P)   VC-Pred L 4.43 4.41 4.39 4.38 (P)   VC-Pre L 4.61 4.40 4.31 4.33 (P)   VC-%Pred-Pre % 104 99 98 98 (P)   DLCOunc-Pred ml/min/mmHg 27.24 27.13 27.02 26.96 (P)   DLCOunc-Pre ml/min/mmHg 22.49 21.15 19.84 21.19 (P)   DLCOunc-%Pred-Pre % 82 77 73 78 (P)   VA-Pre L 6.07 5.88 5.87 5.72 (P)   VA-%Pred-Pre % 93 90 90 87 (P)   (P): Preliminary    Chest imaging:    Recent Results (from the past 24 hour(s))   CT Chest Hi-Resolution wo Contrast    Narrative    CT chest  high resolution without contrast    INDICATION: Pulmonary fibrosis.    COMPARISON: High-resolution CT 11/7/2023    FINDINGS: No contrast. Inspiration and expiration supine imaging  obtained. The included thyroid appears unremarkable. Right shoulder  prosthesis again noted. Prominent aortopulmonary window lymph node  appears similar measuring 2.2 x 0.6 cm, previously 2.0 x 0.8 cm. No  new prominent lymph nodes. Heart size normal. No pleural or  pericardial effusion. Thoracic aortic caliber normal. Pulmonary artery  caliber normal. No suspicious upper abdominal finding.  Bone detail shows mild degenerative spurring in the thoracolumbar  junction region of the spine. No suspicious sclerotic or  lytic/destructive lesion.    Detail of the lungs shows scattered 2 mm right upper lobe pulmonary  nodules also with granulomas in the left upper lobe. No dominant  pulmonary nodules or consolidations. There is mild subpleural  reticular opacification with very slight lower lobe predominance over  upper lobe prominence. This appearance is unchanged.  Expiratory imaging does show some air trapping.      Impression    IMPRESSION: Findings again indeterminate for UIP-type pneumonia.  Cannot entirely exclude respiratory bronchiolitis-interstitial lung  disease or fibrotic nonspecific interstitial pneumonia or fibrotic  hypersensitivity pneumonitis (if applicable exposure history is  present). Scattered unchanged almost certainly benign pulmonary  nodules. 12 month follow-up CT again recommended to ensure stability.  Prominent aortopulmonary window lymph node just under a centimeter in  short axis over 2 cm in long axis grossly unchanged.    SYLVIA FRYE MD         SYSTEM ID:  [id number]     I personally reviewed and interpreted the chest radiographs.

## 2025-06-17 NOTE — NURSING NOTE
Chief Complaint   Patient presents with    Follow Up     Return ILD       Vitals were taken, medications reconciled.    Mohan Worthington, Clinic Assistant   10:13 AM

## 2025-06-17 NOTE — LETTER
6/17/2025      Malachi Domínguez  [address]  [facility] [id number]      Dear Colleague,    Thank you for referring your patient, Malachi Domínguez, to the [facility] AND [facility]. Please see a copy of my visit note below.      [facility] Interstitial Lung Disease Program          Date of Visit: June 17, 2025   Clinic Location:     [facility] SCIENCE [address] [address]  Phone: [phone]  Fax: [phone]    ASSESSMENT:  ILD: Victorino has evidence of mild pulmonary fibrosis based on review of high-resolution CT scan imaging.  He has no pulmonary function limitation, is asymptomatic, and has not experienced any desaturation on the 6-minute walk test.   Imaging characteristics indeterminate for UIP, but could be evolving. High-resolution CT chest 6/18/2024 demonstrates stability of pulmonary fibrosis.  He also has scattered unchanged benign pulmonary nodules.  Prominent aortopulmonary window lymph nodes that appears benign in nature as well.  No characteristic features of pneumoconiosis although does have some exposure history.  Minocycline in very rare situations can cause interstitial lung disease, although this tends to be an acute form of interstitial pneumonia.  It is unlikely that his interstitial lung disease is secondary to Minocycline, but he is already off this medication and I recommended him to abstain from using it if he is okay with it.  MARTI 1:160 positive, nucleolar pattern 11/2023.  Hypersensitivity panel negative. Rheumatology evaluation yielded mildly positive antihistone antibodies ( 2/2024-Radha).   Disease monitoring: Victorino has mild pulmonary fibrosis.  His pulmonary function has remained stable on spirometry and he is completely asymptomatic.   Pulmonary hypertension: Currently low suspicion.  No indication for echocardiogram.  Immunomodulatory  therapy and monitoring: Not indicated  Antifibrotic therapy: With mild pulmonary fibrosis and normal pulmonary function, will hold off on initiating antifibrotic therapy although we discussed briefly the 2 agents and there benefits versus adverse effects profile.   Oxygen supplementation: Currently not indicated.  Pulmonary rehabilitation: Currently not indicated.  Rheumatology evaluation-[facility]-and that some suspicion for undifferentiated connective tissue disorder.  All antibodies have been negative except antihistone antibodies that were mildly positive.    Esophageal evaluation demonstrated mild esophageal dysmotility but he is off PPI due to intolerance and is currently asymptomatic.  At this point, no overt evidence of well-differentiated connective tissue disorder.  Given progression lung disease and intolerance to PPI, I have ordered a referral to [facility] for assessment of GERD and dysmotility.  History of asthma: Seems to be well controlled.  He may have mild emphysema based on my interpretation of his CT scan imaging today.  In any case, he has not had to use albuterol for rescue purposes.  Currently, he is on combination therapy with fluticasone and salmeterol prescribed his primary care physician and reports modest benefit.  He can continue the same for maintenance.  Use albuterol for rescue purposes.    Other issues:  History of tobaco use-quit 1995  History of crack cocaine ( IV)-remote  History of chronic minocyline use ( acne)  RTC: 12 months with PFT  I spent a total of 40 minutes reviewing chart (previous notes), reviewing test results, reviewing chest x rays and CT scans, talking with and examining patient, formulating plan, adjusting medications, and documentation of my findings and plan on the day of the encounter. Time excludes time spent for PFT.   Buster Hurt MD FCCP  Associate Professor of Medicine  Division of Pulmonary, Allergy & Critical Care   [facility]  [facility]      Chief Complaint:   Malachi Domínguez is a 63 year old year old male who is being seen for Follow Up (Return ILD)    HPI:    June 17, 2025: Victorino returns for follow-up.  In the interim since last visit, he has not noted any change in his breathing.  I had sent him to GI for evaluation of GERD and esophageal dysmotility and he is scheduled for pH probe manometry/EGD [facility].  I encouraged him to get this completed.  No fever, chills, night sweats.  He is compliant with his inhalers.     Initial HPI:    Malachi Domínguez is a 63 year old  year old male who presents for evaluation and management of suspected ILD. Briefly, Malachi Domínguez has a history of long-standing asthma for which he follows with Dr Keny Florez at [place]. He has been maintained on Flovent inhaler for a long time although he admits he has not been using it consistently.  Victorino reports that around 3 months ago he started noticing more episodes of coughing, intermittent wheezing and worsening shortness of breath.  He also had significant runny nose and chest congestion.  He denied fever, chills over the last few months.  He has never required oxygen.  Per records, for the symptoms, a CT scan of the chest was obtained which was reportedly suspicious for interstitial lung disease, and hence the referral to our clinic today.  Today, Victorino reports that his symptoms have resolved over the last 1 month.  He does not have significant coughing.  He continues to feel congested intermittently.  He quit taking minocycline as was advised by Dr Florez which she had been taking for a long time for acne.  He is not using his albuterol or Flovent anymore.  He feels his breathing is back to baseline.  He denies any limitation with activity as far as his breathing is concerned and continues to remain very active.      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 He used to work as a , did have some exposure to asbestos and quartz ( transient and not long-term).    Other ( welding, hard metal etc, coffee, mushroom,wood): No    Smoking ( tobacco, marijuana, e-cigarettes, vaping, others): IV cocaine ( 30 years ago), edible marijuana now ( off an on, 5 mg thc,5 mg CBD).  No inhalational exposure anymore.   Drugs (Amiodarone, Bleomycin, Nitrofuranoin, Radiation, Immunotherapy): No  Mold/mildew ( flood, musty basement): No  Birds/ bird droppings (pigeons, doves, parakeets, cockaties, chickens, ducks, geese): No  Feather pillow/blanket/down comforter/ jackets: No  Hot tub/jacuzzi/sauna: No  Humidifier: No  Hobbies- woodworking, brass or woodwind instruments, pottery, gardening: No  Chemotherapy: No    ROS: 12 point ROS negative except mentioned in HPI.    Rheumatic ROS: No dry eyes, dry mouth, raynauds, photosensitivity, joint stiffness/pain > 1 hour, blood in urine        Past Medical History:   Diagnosis Date     Acne      Benign prostate hyperplasia      Exercise-induced asthma      Functional scoliosis with rib asymmetry      Hepatitis C virus infection, unspecified chronicity     S/p 8 weeks of Mavyret- now viral load undetectable     Insomnia      Low back pain with sciatica      Mild recurrent major depression      Olecranon bursitis of right elbow      Peripheral polyneuropathy     S/p cervical and lumbar laminectomy     Right shoulder pain               Past Surgical History:   Procedure Laterality Date     ARTHROPLASTY SHOULDER Right 12/30/2021    Procedure: RIGHT TOTAL SHOULDER ARTHROPLASTY WITH NO CUSTOM GUIDE;  Surgeon: Primitivo Lozada MD;  Location: [facility] OR     DISCECTOMY CERVICAL MINIMALLY INVASIVE ONE LEVEL Right 01/17/2017    L3-L4     ENT SURGERY      Tonsillectomy     LAMINECTOMY  2000    Multiple involves L3-S1     LAMINECTOMY      C6-7 and L4-S1     LAMINECTOMY CERIVCAL POSTERIOR TWO LEVELS      1990     right  "elbow with ulnar nerve transposition and lateral epicondylitis surgery          Social History     Tobacco Use     Smoking status: Former     Current packs/day: 0.00     Average packs/day: 1 pack/day for 20.0 years (20.0 ttl pk-yrs)     Types: Cigarettes     Start date: 1978     Quit date: 1998     Years since quittin.4     Smokeless tobacco: Never   Substance Use Topics     Alcohol use: Not Currently     Comment: Quit 20 years ago     Drug use: Not Currently     Types: \"Crack\" cocaine     Comment: Was a IV drug user- used cocaine 30 years ago                  Family History   Problem Relation Age of Onset     Prostate Cancer Father      Breast Cancer Mother      Skin Cancer Sister           Any family history of ILD:  NA       Current Outpatient Medications   Medication Sig Dispense Refill     DULoxetine HCl 40 MG CPEP Take 1 capsule by mouth daily        fluticasone-salmeterol (ADVAIR) 100-50 MCG/ACT inhaler        GEMTESA 75 MG TABS tablet Take 1 tablet by mouth daily.       Silodosin (RAPAFLO) 8 MG CAPS capsule Take 8 mg by mouth daily        albuterol (PROAIR HFA/PROVENTIL HFA/VENTOLIN HFA) 108 (90 Base) MCG/ACT inhaler Inhale 2 puffs into the lungs every 6 hours (Patient not taking: Reported on 2023)       ASHWAGANDHA PO Take 1 g by mouth daily (Patient not taking: Reported on 2023)       aspirin 81 MG EC tablet Take 1 tablet (81 mg) by mouth 2 times daily (Patient not taking: Reported on 2023) 60 tablet 0     Chorionic Gonadotropin (HCG IJ) Inject as directed twice a week  (Patient not taking: Reported on 2023)       clomiPHENE (CLOMID) 50 MG tablet 25 mg 2 times a week.       fexofenadine (ALLEGRA) 180 MG tablet Take 180 mg by mouth daily       fexofenadine-pseudoePHEDrine (ALLEGRA-D 24) 180-240 MG 24 hr tablet Take 1 tablet by mouth daily (Patient not taking: Reported on 2023)       fluticasone (FLONASE) 50 MCG/ACT nasal spray Spray 1 spray into both nostrils daily   " "    Melatonin 10 MG TBDP  (Patient not taking: Reported on 11/7/2023)       naproxen (NAPROSYN) 500 MG tablet Take 500 mg by mouth as needed for moderate pain (Patient not taking: Reported on 2/18/2025)       oxyBUTYnin ER (DITROPAN XL) 10 MG 24 hr tablet        senna-docusate (SENOKOT-S/PERICOLACE) 8.6-50 MG tablet Take 1-2 tablets by mouth 2 times daily Take while on oral narcotics to prevent or treat constipation. (Patient not taking: Reported on 11/7/2023) 30 tablet 0     TESTOSTERONE ENANTHATE IJ Inject as directed twice a week        No current facility-administered medications for this visit.                        Allergies   Allergen Reactions     Cats      Dogs      Seasonal Allergies      Sulfa Antibiotics Rash                 /78 (BP Location: Right arm, Patient Position: Chair, Cuff Size: Adult Regular)   Pulse 59   Ht 1.778 m (5' 10\")   Wt 67.1 kg (147 lb 14.4 oz)   SpO2 97%   BMI 21.22 kg/m       Body mass index is 21.22 kg/m .        Physical Examination    General: Well developed, well nourished, No apparent distress  Eyes: Anicteric  Nose: Nasal mucosa with no edema or hyperemia.  No polyps  Ears: Hearing grossly normal  Mouth: Oral mucosa is moist, without any lesions. No oropharyngeal exudate.  Respiratory: Good air movement.  Scattered basilar crackles, clears with coughing,  no rhonchi. No wheezes  Cardiac: RRR, normal S1, S2. No murmurs. No JVD  Abdomen: Soft, NT/ND  Musculoskeletal: Mild arthritis +  Skin: No rash on limited exam  Neuro: Normal mentation. Normal speech.  Psych:Normal affect         RESULTS:  Serologies:  11/2/23 (outside): Negative rheumatoid factor   Latest Reference Range & Units 11/07/23 09:34   Aldolase 1.2 - 7.6 U/L 5.1   CK Total 39 - 308 U/L 85   CRP Inflammation <5.00 mg/L <3.00   Cyclic Citrullinated Peptide Antibody, IgG <7.0 U/mL 1.3   Rheumatoid Factor <12 IU/mL <6   Aspergillus Fumagatis 1 Antibody None Detected  None Detected   Aspergillus Fumagatis 6 " Antibody None Detected  None Detected   Aureo Pullulans None Detected  None Detected   [place] serum None Detected  None Detected   Micropolyspora Faeni None Detected  None Detected   Sed Rate 0 - 20 mm/hr 10   ANTI NUCLEAR SONAL IGG BY IFA WITH REFLEX  Rpt !   !: Data is abnormal  MARTI 11/2023:        PFT interpretation: I personally reviewed and interpreted the PFTs-normal spirometry, normal lung volumes and normal diffusion capacity.      Latest Reference Range & Units 11/07/23 07:54 06/18/24 07:21 02/18/25 09:41 06/17/25 09:12   FVC-Pred L 4.20 4.18 4.16 4.14 (P)   FVC-Pre L 4.43 4.25 4.07 4.35 (P)   FVC-%Pred-Pre % 105 101 97 105 (P)   FEV1-Pre L 3.73 3.54 3.42 3.62 (P)   FEV1-%Pred-Pre % 114 108 105 112 (P)   FEV1FVC-Pred % 78 78 78 78 (P)   FEV1FVC-Pre % 84 83 84 83 (P)   FEV1SVC-Pred % 74 74 74 73 (P)   FEV1SVC-Pre % 81 80 79 84 (P)   FEV1FEV6-Pred % 79 79 79 79 (P)   FEV1FEV6-Pre % 84 84 85 83 (P)   FEFMax-Pred L/sec 9.07 9.02 8.97 8.94 (P)   FEFMax-Pre L/sec 8.76 9.15 9.16 9.07 (P)   FEFMax-%Pred-Pre % 96 101 102 101 (P)   FEF2575-Pred L/sec 2.73 2.69 2.66 2.64 (P)   FEF2575-Pre L/sec 4.53 4.11 3.92 4.03 (P)   ILS0301-%Pred-Pre % 166 152 147 152 (P)   FIFMax-Pre L/sec 5.76 6.73 6.87 6.30 (P)   ExpTime-Pre sec 6.12 6.25 7.03 6.50 (P)   FRCPleth-Pred L 3.63  3.67 3.67 (P)   FRCPleth-Pre L 3.39  3.08 3.47 (P)   FRCPleth-%Pred-Pre % 93  83 94 (P)   RVPleth-Pred L 2.46  2.49 2.50 (P)   RVPleth-Pre L 2.20  1.83 2.15 (P)   RVPleth-%Pred-Pre % 89  73 85 (P)   TLCPleth-Pred L 7.13  7.13 7.13 (P)   TLCPleth-Pre L 6.81  6.14 6.48 (P)   TLCPleth-%Pred-Pre % 95  86 90 (P)   ERV-Pred L 1.55 1.55 1.54 1.54 (P)   ERV-Pre L 1.19 1.25 1.26 1.32 (P)   ERV-%Pred-Pre % 76 80 81 86 (P)   IC-Pred L 3.11 3.09 3.08 3.07 (P)   IC-Pre L 3.42 3.15 3.06 3.00 (P)   IC-%Pred-Pre % 110 101 99 97 (P)   VC-Pred L 4.43 4.41 4.39 4.38 (P)   VC-Pre L 4.61 4.40 4.31 4.33 (P)   VC-%Pred-Pre % 104 99 98 98 (P)   DLCOunc-Pred ml/min/mmHg 27.24 27.13  27.02 26.96 (P)   DLCOunc-Pre ml/min/mmHg 22.49 21.15 19.84 21.19 (P)   DLCOunc-%Pred-Pre % 82 77 73 78 (P)   VA-Pre L 6.07 5.88 5.87 5.72 (P)   VA-%Pred-Pre % 93 90 90 87 (P)   (P): Preliminary    Chest imaging:    Recent Results (from the past 24 hour(s))   CT Chest Hi-Resolution wo Contrast    Narrative    CT chest high resolution without contrast    INDICATION: Pulmonary fibrosis.    COMPARISON: High-resolution CT 11/7/2023    FINDINGS: No contrast. Inspiration and expiration supine imaging  obtained. The included thyroid appears unremarkable. Right shoulder  prosthesis again noted. Prominent aortopulmonary window lymph node  appears similar measuring 2.2 x 0.6 cm, previously 2.0 x 0.8 cm. No  new prominent lymph nodes. Heart size normal. No pleural or  pericardial effusion. Thoracic aortic caliber normal. Pulmonary artery  caliber normal. No suspicious upper abdominal finding.  Bone detail shows mild degenerative spurring in the thoracolumbar  junction region of the spine. No suspicious sclerotic or  lytic/destructive lesion.    Detail of the lungs shows scattered 2 mm right upper lobe pulmonary  nodules also with granulomas in the left upper lobe. No dominant  pulmonary nodules or consolidations. There is mild subpleural  reticular opacification with very slight lower lobe predominance over  upper lobe prominence. This appearance is unchanged.  Expiratory imaging does show some air trapping.      Impression    IMPRESSION: Findings again indeterminate for UIP-type pneumonia.  Cannot entirely exclude respiratory bronchiolitis-interstitial lung  disease or fibrotic nonspecific interstitial pneumonia or fibrotic  hypersensitivity pneumonitis (if applicable exposure history is  present). Scattered unchanged almost certainly benign pulmonary  nodules. 12 month follow-up CT again recommended to ensure stability.  Prominent aortopulmonary window lymph node just under a centimeter in  short axis over 2 cm in long axis  grossly unchanged.    YSLVIA FRYE MD         SYSTEM ID:  [id number]     I personally reviewed and interpreted the chest radiographs.      Again, thank you for allowing me to participate in the care of your patient.        Sincerely,        Buster Hurt MD    Electronically signed

## 2025-06-25 ENCOUNTER — HOSPITAL ENCOUNTER (OUTPATIENT)
Facility: AMBULATORY SURGERY CENTER | Age: 65
End: 2025-06-25
Attending: INTERNAL MEDICINE
Payer: COMMERCIAL

## 2025-07-01 ENCOUNTER — TRANSFERRED RECORDS (OUTPATIENT)
Dept: HEALTH INFORMATION MANAGEMENT | Facility: CLINIC | Age: 65
End: 2025-07-01
Payer: COMMERCIAL

## 2025-07-03 ENCOUNTER — MYC MEDICAL ADVICE (OUTPATIENT)
Dept: GASTROENTEROLOGY | Facility: CLINIC | Age: 65
End: 2025-07-03
Payer: COMMERCIAL

## 2025-07-03 DIAGNOSIS — K74.00 FIBROSIS OF LIVER: Primary | ICD-10-CM

## (undated) DEVICE — PACK OPEN SHOULDER SOP15OCFSC

## (undated) DEVICE — SU DERMABOND PRINEO 22CM CLR222US

## (undated) DEVICE — GLOVE PROTEXIS POWDER FREE 6.0 ORTHOPEDIC 2D73ET60

## (undated) DEVICE — DRSG ABDOMINAL 07 1/2X8" 7197D

## (undated) DEVICE — SU FIBERWIRE 2 38"  AR-7200

## (undated) DEVICE — BONE CLEANING TIP INTERPULSE FEMORAL CANAL 0210-008-000

## (undated) DEVICE — SUCTION IRR SYSTEM W/O TIP INTERPULSE HANDPIECE 0210-100-000

## (undated) DEVICE — SOLUTION WOUND CLEANSING 3/4OZ 10% PVP EA-L3011FB-50

## (undated) DEVICE — SU VICRYL 3-0 SH 27" UND J416H

## (undated) DEVICE — WRAP SHOULDER THERAPUTIC B-COOL 0814-3321

## (undated) DEVICE — SPONGE LAP 18X18" X8435

## (undated) DEVICE — SU MONOCRYL 4-0 PS-2 18" UND Y496G

## (undated) DEVICE — BONE CEMENT MIXEVAC III HI VAC KIT  0206-015-000

## (undated) DEVICE — PIN ALIGNMENT 2.5X200MM

## (undated) DEVICE — SOL NACL 0.9% IRRIG 1000ML BOTTLE 2F7124

## (undated) DEVICE — HOOD FLYTE W/PEELAWAY 408-800-100

## (undated) DEVICE — LINEN TOWEL PACK X5 5464

## (undated) DEVICE — SOL WATER IRRIG 1000ML BOTTLE 2F7114

## (undated) DEVICE — GLOVE PROTEXIS BLUE W/NEU-THERA 6.5  2D73EB65

## (undated) DEVICE — GLOVE PROTEXIS W/NEU-THERA 7.5  2D73TE75

## (undated) DEVICE — MANIFOLD NEPTUNE 4 PORT 700-20

## (undated) DEVICE — PREP CHLORAPREP 26ML TINTED ORANGE  260815

## (undated) DEVICE — ESU GROUND PAD UNIVERSAL W/O CORD

## (undated) DEVICE — DRAPE IOBAN INCISE 23X17" 6650EZ

## (undated) DEVICE — SU ETHIBOND 5 V-37 4X30" MB66G

## (undated) DEVICE — BLADE SAW SAGITTAL STRK MED WIDE 25X73X0.89MM 2108-105-000

## (undated) DEVICE — ESU PENCIL W/SMOKE EVAC NEPTUNE STRYKER 0703-046-000

## (undated) DEVICE — SYR 50ML CATH TIP W/O NDL 309620

## (undated) DEVICE — DRAPE SHEET REV FOLD 3/4 9349

## (undated) DEVICE — GLOVE PROTEXIS BLUE W/NEU-THERA 8.0  2D73EB80

## (undated) DEVICE — SOL NACL 0.9% IRRIG 3000ML BAG 2B7477

## (undated) DEVICE — PACK SET-UP STD 9102

## (undated) DEVICE — SU VICRYL 0 CT-1 27" J340H

## (undated) DEVICE — SUCTION TIP YANKAUER STR K87

## (undated) DEVICE — DRAPE ARTHROSCOPY SHOULDER BEACHCHAIR 29369

## (undated) DEVICE — PREP SKIN SCRUB TRAY 4461A

## (undated) DEVICE — Device

## (undated) DEVICE — IMM SLING SHOULDER LG BUCKLE 79-84247

## (undated) RX ORDER — VANCOMYCIN HYDROCHLORIDE 1 G/20ML
INJECTION, POWDER, LYOPHILIZED, FOR SOLUTION INTRAVENOUS
Status: DISPENSED
Start: 2021-12-30

## (undated) RX ORDER — ACETAMINOPHEN 325 MG/1
TABLET ORAL
Status: DISPENSED
Start: 2021-12-30

## (undated) RX ORDER — TRANEXAMIC ACID 650 MG/1
TABLET ORAL
Status: DISPENSED
Start: 2021-12-30

## (undated) RX ORDER — CEFAZOLIN SODIUM 2 G/100ML
INJECTION, SOLUTION INTRAVENOUS
Status: DISPENSED
Start: 2021-12-30

## (undated) RX ORDER — GLYCOPYRROLATE 0.2 MG/ML
INJECTION, SOLUTION INTRAMUSCULAR; INTRAVENOUS
Status: DISPENSED
Start: 2021-12-30

## (undated) RX ORDER — ONDANSETRON 2 MG/ML
INJECTION INTRAMUSCULAR; INTRAVENOUS
Status: DISPENSED
Start: 2021-12-30

## (undated) RX ORDER — NEOSTIGMINE METHYLSULFATE 1 MG/ML
VIAL (ML) INJECTION
Status: DISPENSED
Start: 2021-12-30

## (undated) RX ORDER — LIDOCAINE HYDROCHLORIDE 20 MG/ML
INJECTION, SOLUTION EPIDURAL; INFILTRATION; INTRACAUDAL; PERINEURAL
Status: DISPENSED
Start: 2021-12-30

## (undated) RX ORDER — FENTANYL CITRATE 50 UG/ML
INJECTION, SOLUTION INTRAMUSCULAR; INTRAVENOUS
Status: DISPENSED
Start: 2021-12-30

## (undated) RX ORDER — PROPOFOL 10 MG/ML
INJECTION, EMULSION INTRAVENOUS
Status: DISPENSED
Start: 2021-12-30

## (undated) RX ORDER — ALBUMIN, HUMAN INJ 5% 5 %
SOLUTION INTRAVENOUS
Status: DISPENSED
Start: 2021-12-30

## (undated) RX ORDER — DEXAMETHASONE SODIUM PHOSPHATE 4 MG/ML
INJECTION, SOLUTION INTRA-ARTICULAR; INTRALESIONAL; INTRAMUSCULAR; INTRAVENOUS; SOFT TISSUE
Status: DISPENSED
Start: 2021-12-30